# Patient Record
Sex: FEMALE | Race: BLACK OR AFRICAN AMERICAN | NOT HISPANIC OR LATINO | Employment: FULL TIME | URBAN - METROPOLITAN AREA
[De-identification: names, ages, dates, MRNs, and addresses within clinical notes are randomized per-mention and may not be internally consistent; named-entity substitution may affect disease eponyms.]

---

## 2017-04-19 ENCOUNTER — APPOINTMENT (OUTPATIENT)
Dept: LAB | Facility: HOSPITAL | Age: 47
End: 2017-04-19

## 2017-04-19 ENCOUNTER — OFFICE VISIT (OUTPATIENT)
Dept: INTERNAL MEDICINE | Age: 47
End: 2017-04-19

## 2017-04-19 ENCOUNTER — HOSPITAL ENCOUNTER (OUTPATIENT)
Dept: MRI IMAGING | Facility: HOSPITAL | Age: 47
Discharge: HOME | End: 2017-04-19
Admitting: INTERNAL MEDICINE

## 2017-04-19 VITALS
TEMPERATURE: 97.5 F | BODY MASS INDEX: 48.32 KG/M2 | HEIGHT: 64 IN | OXYGEN SATURATION: 96 % | HEART RATE: 98 BPM | SYSTOLIC BLOOD PRESSURE: 132 MMHG | DIASTOLIC BLOOD PRESSURE: 78 MMHG | WEIGHT: 283 LBS

## 2017-04-19 DIAGNOSIS — R00.2 HEART PALPITATIONS: ICD-10-CM

## 2017-04-19 DIAGNOSIS — J45.30 MILD PERSISTENT ASTHMA WITHOUT COMPLICATION: Chronic | ICD-10-CM

## 2017-04-19 DIAGNOSIS — R20.0 RIGHT FACIAL NUMBNESS: Primary | ICD-10-CM

## 2017-04-19 DIAGNOSIS — I10 ESSENTIAL HYPERTENSION: Chronic | ICD-10-CM

## 2017-04-19 DIAGNOSIS — E66.01 MORBID OBESITY, UNSPECIFIED OBESITY TYPE (HCC): Chronic | ICD-10-CM

## 2017-04-19 DIAGNOSIS — G43.109 MIGRAINE WITH AURA AND WITHOUT STATUS MIGRAINOSUS, NOT INTRACTABLE: Chronic | ICD-10-CM

## 2017-04-19 LAB
ALBUMIN SERPL-MCNC: 3.8 G/DL (ref 3.5–5.2)
ALBUMIN/GLOB SERPL: 1 G/DL
ALP SERPL-CCNC: 89 U/L (ref 39–117)
ALT SERPL W P-5'-P-CCNC: 16 U/L (ref 1–33)
ANION GAP SERPL CALCULATED.3IONS-SCNC: 12.4 MMOL/L
AST SERPL-CCNC: 16 U/L (ref 1–32)
BASOPHILS # BLD AUTO: 0.02 10*3/MM3 (ref 0–0.2)
BASOPHILS NFR BLD AUTO: 0.2 % (ref 0–1.5)
BILIRUB SERPL-MCNC: 0.2 MG/DL (ref 0.1–1.2)
BUN BLD-MCNC: 10 MG/DL (ref 6–20)
BUN/CREAT SERPL: 11.6 (ref 7–25)
CALCIUM SPEC-SCNC: 9.5 MG/DL (ref 8.6–10.5)
CHLORIDE SERPL-SCNC: 101 MMOL/L (ref 98–107)
CO2 SERPL-SCNC: 27.6 MMOL/L (ref 22–29)
CREAT BLD-MCNC: 0.86 MG/DL (ref 0.57–1)
DEPRECATED RDW RBC AUTO: 51.1 FL (ref 37–54)
EOSINOPHIL # BLD AUTO: 0.1 10*3/MM3 (ref 0–0.7)
EOSINOPHIL NFR BLD AUTO: 1.2 % (ref 0.3–6.2)
ERYTHROCYTE [DISTWIDTH] IN BLOOD BY AUTOMATED COUNT: 17.3 % (ref 11.7–13)
GFR SERPL CREATININE-BSD FRML MDRD: 71 ML/MIN/1.73
GLOBULIN UR ELPH-MCNC: 3.8 GM/DL
GLUCOSE BLD-MCNC: 80 MG/DL (ref 65–99)
HBA1C MFR BLD: 6.63 % (ref 4.8–5.6)
HCT VFR BLD AUTO: 37.4 % (ref 35.6–45.5)
HGB BLD-MCNC: 11.1 G/DL (ref 11.9–15.5)
IMM GRANULOCYTES # BLD: 0 10*3/MM3 (ref 0–0.03)
IMM GRANULOCYTES NFR BLD: 0 % (ref 0–0.5)
LYMPHOCYTES # BLD AUTO: 2.07 10*3/MM3 (ref 0.9–4.8)
LYMPHOCYTES NFR BLD AUTO: 25.7 % (ref 19.6–45.3)
MCH RBC QN AUTO: 24 PG (ref 26.9–32)
MCHC RBC AUTO-ENTMCNC: 29.7 G/DL (ref 32.4–36.3)
MCV RBC AUTO: 81 FL (ref 80.5–98.2)
MONOCYTES # BLD AUTO: 0.36 10*3/MM3 (ref 0.2–1.2)
MONOCYTES NFR BLD AUTO: 4.5 % (ref 5–12)
NEUTROPHILS # BLD AUTO: 5.51 10*3/MM3 (ref 1.9–8.1)
NEUTROPHILS NFR BLD AUTO: 68.4 % (ref 42.7–76)
PLATELET # BLD AUTO: 397 10*3/MM3 (ref 140–500)
PMV BLD AUTO: 9.8 FL (ref 6–12)
POTASSIUM BLD-SCNC: 4.3 MMOL/L (ref 3.5–5.2)
PROT SERPL-MCNC: 7.6 G/DL (ref 6–8.5)
RBC # BLD AUTO: 4.62 10*6/MM3 (ref 3.9–5.2)
SODIUM BLD-SCNC: 141 MMOL/L (ref 136–145)
T4 FREE SERPL-MCNC: 1.18 NG/DL (ref 0.93–1.7)
TSH SERPL DL<=0.05 MIU/L-ACNC: 2.04 MIU/ML (ref 0.27–4.2)
WBC NRBC COR # BLD: 8.06 10*3/MM3 (ref 4.5–10.7)

## 2017-04-19 PROCEDURE — 84439 ASSAY OF FREE THYROXINE: CPT | Performed by: INTERNAL MEDICINE

## 2017-04-19 PROCEDURE — 99205 OFFICE O/P NEW HI 60 MIN: CPT | Performed by: INTERNAL MEDICINE

## 2017-04-19 PROCEDURE — 36415 COLL VENOUS BLD VENIPUNCTURE: CPT | Performed by: INTERNAL MEDICINE

## 2017-04-19 PROCEDURE — 83036 HEMOGLOBIN GLYCOSYLATED A1C: CPT | Performed by: INTERNAL MEDICINE

## 2017-04-19 PROCEDURE — 85025 COMPLETE CBC W/AUTO DIFF WBC: CPT | Performed by: INTERNAL MEDICINE

## 2017-04-19 PROCEDURE — A9577 INJ MULTIHANCE: HCPCS | Performed by: INTERNAL MEDICINE

## 2017-04-19 PROCEDURE — 82565 ASSAY OF CREATININE: CPT

## 2017-04-19 PROCEDURE — 84443 ASSAY THYROID STIM HORMONE: CPT | Performed by: INTERNAL MEDICINE

## 2017-04-19 PROCEDURE — 93000 ELECTROCARDIOGRAM COMPLETE: CPT | Performed by: INTERNAL MEDICINE

## 2017-04-19 PROCEDURE — 80053 COMPREHEN METABOLIC PANEL: CPT | Performed by: INTERNAL MEDICINE

## 2017-04-19 PROCEDURE — 70553 MRI BRAIN STEM W/O & W/DYE: CPT

## 2017-04-19 PROCEDURE — 93041 RHYTHM ECG TRACING: CPT | Performed by: INTERNAL MEDICINE

## 2017-04-19 PROCEDURE — 0 GADOBENATE DIMEGLUMINE 529 MG/ML SOLUTION: Performed by: INTERNAL MEDICINE

## 2017-04-19 RX ORDER — VALSARTAN AND HYDROCHLOROTHIAZIDE 320; 25 MG/1; MG/1
1 TABLET, FILM COATED ORAL DAILY
COMMUNITY
Start: 2017-04-19 | End: 2020-01-06 | Stop reason: ALTCHOICE

## 2017-04-19 RX ORDER — VALSARTAN AND HYDROCHLOROTHIAZIDE 320; 25 MG/1; MG/1
1 TABLET, FILM COATED ORAL DAILY
COMMUNITY
End: 2017-04-19 | Stop reason: SDUPTHER

## 2017-04-19 RX ORDER — ASPIRIN 325 MG
325 TABLET, DELAYED RELEASE (ENTERIC COATED) ORAL DAILY
Refills: 0 | COMMUNITY
Start: 2017-04-19 | End: 2022-09-13

## 2017-04-19 RX ORDER — MONTELUKAST SODIUM 10 MG/1
10 TABLET ORAL NIGHTLY
COMMUNITY
End: 2017-04-19 | Stop reason: SDUPTHER

## 2017-04-19 RX ORDER — MONTELUKAST SODIUM 10 MG/1
10 TABLET ORAL NIGHTLY
COMMUNITY
Start: 2017-04-19 | End: 2023-03-09

## 2017-04-19 RX ADMIN — GADOBENATE DIMEGLUMINE 20 ML: 529 INJECTION, SOLUTION INTRAVENOUS at 15:31

## 2017-04-19 NOTE — NURSING NOTE
Dr. Hobbs, radiologist, called to say patient's MRI looked good. Dr. Hobbs spoke with Dr. Villaseñor and patient is okay to go home and Dr. Villaseñor will follow up with patient tomorrow. Patient's IV removed and shown how to get out to the lobby.

## 2017-04-19 NOTE — PROGRESS NOTES
"Tori Urena / 46 y.o. / female  04/19/2017    VITALS:    /78  Pulse 98  Temp 97.5 °F (36.4 °C)  Ht 64\" (162.6 cm)  Wt 283 lb (128 kg)  SpO2 96%  BMI 48.58 kg/m2  BP Readings from Last 3 Encounters:   04/19/17 132/78     Wt Readings from Last 3 Encounters:   04/19/17 283 lb (128 kg)      Body mass index is 48.58 kg/(m^2).    CC: Main reason(s) for today's visit: Establish Care (new pt to establish care) and right side facial numbness Monday      HPI:    Patient is a 46 y.o. female who is here to establish care. Had episode of right facial numbness last >24 hours on Monday. Came on abruptly. At onset felt somewhat disoriented/confused and had left hand shaking. Denies loss of vision, slurred speech but did experience some difficulty speaking. Denies having had weakness, loss of coordination. Denies h/o seizures but has h/o migraine w/ aura. Risk factors include hypertension, prediabetes, morbid obesity and likely severe JAS. She denies recurrence of symptoms since Monday.       ____________________________________________________________________    ASSESSMENT & PLAN:    1. Right facial numbness    2. Migraine with aura and without status migrainosus, not intractable    3. Heart palpitations    4. Essential hypertension    5. Mild persistent asthma without complication    6. Morbid obesity, unspecified obesity type      Orders Placed This Encounter   Procedures   • MRI Brain With & Without Contrast   • Hemoglobin A1c   • Comprehensive Metabolic Panel   • TSH+Free T4   • Adult Transthoracic Echo Complete        Summary/Discussion:     ·  Differential diagnosis includes stroke, TIA, migraine aura without headache and less likely focal seizure.  · Will check stat MRI of the brain with and without contrast to rule out stroke  · Schedule carotid Doppler and echocardiogram but this/next week  · Start enteric-coated aspirin 325 mg daily  · Blood pressure appears to be controlled therefore continue " valsartan/hydrochlorothiazide 320/25 daily  · History of palpitations, check EKG and rhythm strip, consider Holter monitoring.  Check echocardiogram.  · On follow-up schedule sleep study for probable severe sleep apnea  · Instructed her to go to the emergency department if she has recurrent symptoms.  She expressed understanding.      Return in about 1 month (around 5/19/2017) for Reassess today's problem(s).    No future appointments.    ____________________________________________________________________    REVIEW OF SYSTEMS    Review of Systems   Constitutional: Positive for fatigue.   HENT: Negative.    Eyes: Negative.    Respiratory: Negative.  Apnea: snoring.    Cardiovascular: Positive for palpitations. Negative for chest pain.   Gastrointestinal: Negative.    Endocrine: Negative.         Morbidly obese    Genitourinary: Negative.    Musculoskeletal: Negative.    Skin: Negative.    Allergic/Immunologic: Negative.    Neurological: Positive for dizziness. Negative for seizures, syncope, facial asymmetry, speech difficulty (not currently) and weakness. Tremors: not currently. Numbness: right facial. Headaches: h/o migraines.   Hematological: Negative.    Psychiatric/Behavioral: Negative.      Other: As noted per HPI      PHYSICAL EXAMINATION    Physical Exam   Constitutional: She is oriented to person, place, and time. She appears well-developed and well-nourished. No distress.   morbidly obese    HENT:   Head: Normocephalic and atraumatic.   Right Ear: Tympanic membrane normal.   Left Ear: Tympanic membrane normal.   Mellampati Airway Class 3    Eyes: Conjunctivae and EOM are normal. Pupils are equal, round, and reactive to light. No scleral icterus.   Neck: Neck supple. No tracheal deviation present. No thyroid mass and no thyromegaly present.   Cardiovascular: Normal rate, regular rhythm, normal heart sounds and intact distal pulses.    Pulses:       Carotid pulses are 2+ on the right side, and 2+ on the left  side.  No carotid bruits   Pulmonary/Chest: Effort normal and breath sounds normal.   Abdominal: Soft. Bowel sounds are normal. She exhibits no distension and no mass. There is no tenderness. No hernia.   Protuberant    Musculoskeletal: She exhibits no edema.   Lymphadenopathy:     She has no cervical adenopathy.        Right: No supraclavicular adenopathy present.        Left: No supraclavicular adenopathy present.   Neurological: She is alert and oriented to person, place, and time. She has normal reflexes. No cranial nerve deficit. She exhibits normal muscle tone. Coordination normal.   Skin: Skin is warm. No rash noted. No pallor.   Psychiatric: She has a normal mood and affect. Her behavior is normal. Judgment and thought content normal.       REVIEWED DATA:    Labs:   No results found for: NA, K, AST, ALT, BUN, CREATININE, EGFRIFNONA, EGFRIFAFRI    No results found for: GLU, HGBA1C, MICROALBUR    No results found for: LDL, HDL, TRIG, CHOLHDLRATIO    No results found for: TSH, FREET4     No results found for: WBC, HGB, PLT      Imaging:        Medical Tests:   EKG: Performed today and reviewed results with patient.  normal EKG, normal sinus rhythm, there are no previous tracings available for comparison.       EKG rhythm: normal sinus rhythm    Summary of old records / correspondence / consultant report:        Request outside records:        ______________________________________________________________________    ALLERGIES:    Review of patient's allergies indicates no known allergies.    MEDICATIONS:       Current Outpatient Prescriptions   Medication Sig Dispense Refill   • montelukast (SINGULAIR) 10 MG tablet Take 1 tablet by mouth Every Night.     • valsartan-hydrochlorothiazide (DIOVAN-HCT) 320-25 MG per tablet Take 1 tablet by mouth Daily.       No current facility-administered medications for this visit.        PFSH: The following portions of the patient's history were reviewed and updated as  appropriate: Allergies / Current Medications / Past Medical History / Surgical History / Social History / Family History    PROBLEM LIST:    Patient Active Problem List   Diagnosis   • Hypertension   • Migraine with aura and without status migrainosus, not intractable   • Mild persistent asthma without complication   • Morbid obesity       PAST MEDICAL HX:    Past Medical History:   Diagnosis Date   • Asthma    • Hypertension    • Obesity        PAST SURGICAL HX:    History reviewed. No pertinent surgical history.    SOCIAL HX:    Social History     Social History   • Marital status: Single     Spouse name: N/A   • Number of children: 0   • Years of education: N/A     Occupational History   • [employer] ([employer])      Social History Main Topics   • Smoking status: Never Smoker   • Smokeless tobacco: None   • Alcohol use No   • Drug use: None   • Sexual activity: No     Other Topics Concern   • None     Social History Narrative   • None       FAMILY HX:    Family History   Problem Relation Age of Onset   • Diabetes Mother    • Hypertension Mother    • Diabetes Maternal Grandmother    • Colon cancer Maternal Grandmother    • Sickle cell anemia Father          **Tiffanie Disclaimer:   Much of this encounter note is an electronic transcription/translation of spoken language to printed text. The electronic translation of spoken language may permit erroneous, or at times, nonsensical words or phrases to be inadvertently transcribed. Although I have reviewed the note for such errors, some may still exist.

## 2017-04-20 ENCOUNTER — TELEPHONE (OUTPATIENT)
Dept: INTERNAL MEDICINE | Age: 47
End: 2017-04-20

## 2017-04-20 DIAGNOSIS — E11.9 TYPE 2 DIABETES MELLITUS WITHOUT COMPLICATION, WITHOUT LONG-TERM CURRENT USE OF INSULIN (HCC): ICD-10-CM

## 2017-04-20 DIAGNOSIS — R06.83 SNORING: Primary | ICD-10-CM

## 2017-04-20 LAB — CREAT BLDA-MCNC: 0.9 MG/DL (ref 0.6–1.3)

## 2017-04-20 NOTE — TELEPHONE ENCOUNTER
Talked w/ Tori :    1. Labs are c/w DM 2: start metformin 850 mg daily w/ supper.  2. MRI did not reveal any significant abnormalities that would explain her sxs. In particular no evidence of a stroke.   3. Recommended to proceed w/ carotids and echo but have them done by next week.  4. Will proceed w/ sleep study referral.  5. Continue ASA daily.  Keep f/u for May 19.    She expressed understanding and agreed to follow above instructions.

## 2017-05-03 ENCOUNTER — APPOINTMENT (OUTPATIENT)
Dept: SLEEP MEDICINE | Facility: HOSPITAL | Age: 47
End: 2017-05-03

## 2017-05-05 ENCOUNTER — APPOINTMENT (OUTPATIENT)
Dept: CARDIOLOGY | Facility: HOSPITAL | Age: 47
End: 2017-05-05

## 2017-05-08 ENCOUNTER — HOSPITAL ENCOUNTER (OUTPATIENT)
Dept: CARDIOLOGY | Facility: HOSPITAL | Age: 47
Discharge: HOME | End: 2017-05-08
Admitting: INTERNAL MEDICINE

## 2017-05-08 ENCOUNTER — HOSPITAL ENCOUNTER (OUTPATIENT)
Dept: CARDIOLOGY | Facility: HOSPITAL | Age: 47
Discharge: HOME | End: 2017-05-08

## 2017-05-08 VITALS
SYSTOLIC BLOOD PRESSURE: 150 MMHG | HEART RATE: 94 BPM | HEIGHT: 64 IN | DIASTOLIC BLOOD PRESSURE: 109 MMHG | BODY MASS INDEX: 48.32 KG/M2 | WEIGHT: 283 LBS

## 2017-05-08 LAB
AORTIC ARCH: 2.6 CM
ASCENDING AORTA: 2.9 CM
BH CV ECHO MEAS - ACS: 1.9 CM
BH CV ECHO MEAS - AO MAX PG (FULL): 6 MMHG
BH CV ECHO MEAS - AO MEAN PG (FULL): 1 MMHG
BH CV ECHO MEAS - AO MEAN PG: 3 MMHG
BH CV ECHO MEAS - AO ROOT AREA (BSA CORRECTED): 1.2
BH CV ECHO MEAS - AO ROOT AREA: 6.2 CM^2
BH CV ECHO MEAS - AO ROOT DIAM: 2.8 CM
BH CV ECHO MEAS - AO V2 MAX: 120 CM/SEC
BH CV ECHO MEAS - AO V2 MEAN: 87.5 CM/SEC
BH CV ECHO MEAS - AO V2 VTI: 26.1 CM
BH CV ECHO MEAS - ASC AORTA: 2.9 CM
BH CV ECHO MEAS - AVA(I,A): 2.3 CM^2
BH CV ECHO MEAS - AVA(I,D): 2.3 CM^2
BH CV ECHO MEAS - BSA(HAYCOCK): 2.5 M^2
BH CV ECHO MEAS - BSA: 2.3 M^2
BH CV ECHO MEAS - BZI_BMI: 48.6 KILOGRAMS/M^2
BH CV ECHO MEAS - BZI_METRIC_HEIGHT: 162.6 CM
BH CV ECHO MEAS - BZI_METRIC_WEIGHT: 128.4 KG
BH CV ECHO MEAS - CONTRAST EF (2CH): 66.2 ML/M^2
BH CV ECHO MEAS - CONTRAST EF 4CH: 62.5 ML/M^2
BH CV ECHO MEAS - EDV(CUBED): 103.8 ML
BH CV ECHO MEAS - EDV(MOD-SP2): 65 ML
BH CV ECHO MEAS - EDV(MOD-SP4): 56 ML
BH CV ECHO MEAS - EDV(TEICH): 102.4 ML
BH CV ECHO MEAS - EF(CUBED): 71.3 %
BH CV ECHO MEAS - EF(MOD-SP2): 66.2 %
BH CV ECHO MEAS - EF(MOD-SP4): 62.5 %
BH CV ECHO MEAS - EF(TEICH): 63 %
BH CV ECHO MEAS - ESV(CUBED): 29.8 ML
BH CV ECHO MEAS - ESV(MOD-SP2): 22 ML
BH CV ECHO MEAS - ESV(MOD-SP4): 21 ML
BH CV ECHO MEAS - ESV(TEICH): 37.9 ML
BH CV ECHO MEAS - FS: 34 %
BH CV ECHO MEAS - IVS/LVPW: 0.9
BH CV ECHO MEAS - IVSD: 0.9 CM
BH CV ECHO MEAS - LAT PEAK E' VEL: 10 CM/SEC
BH CV ECHO MEAS - LV DIASTOLIC VOL/BSA (35-75): 24.7 ML/M^2
BH CV ECHO MEAS - LV MASS(C)D: 153.4 GRAMS
BH CV ECHO MEAS - LV MASS(C)DI: 67.7 GRAMS/M^2
BH CV ECHO MEAS - LV MEAN PG: 2 MMHG
BH CV ECHO MEAS - LV SYSTOLIC VOL/BSA (12-30): 9.3 ML/M^2
BH CV ECHO MEAS - LV V1 MAX: 88 CM/SEC
BH CV ECHO MEAS - LV V1 MEAN: 62.5 CM/SEC
BH CV ECHO MEAS - LV V1 VTI: 18.7 CM
BH CV ECHO MEAS - LVIDD: 4.7 CM
BH CV ECHO MEAS - LVIDS: 3.1 CM
BH CV ECHO MEAS - LVLD AP2: 7.6 CM
BH CV ECHO MEAS - LVLD AP4: 7.8 CM
BH CV ECHO MEAS - LVLS AP2: 7 CM
BH CV ECHO MEAS - LVLS AP4: 7 CM
BH CV ECHO MEAS - LVOT AREA (M): 3.1 CM^2
BH CV ECHO MEAS - LVOT AREA: 3.1 CM^2
BH CV ECHO MEAS - LVOT DIAM: 2 CM
BH CV ECHO MEAS - LVPWD: 1 CM
BH CV ECHO MEAS - MED PEAK E' VEL: 10 CM/SEC
BH CV ECHO MEAS - MV A DUR: 0.09 SEC
BH CV ECHO MEAS - MV A MAX VEL: 83.9 CM/SEC
BH CV ECHO MEAS - MV DEC SLOPE: 504 CM/SEC^2
BH CV ECHO MEAS - MV DEC TIME: 0.17 SEC
BH CV ECHO MEAS - MV E MAX VEL: 63.7 CM/SEC
BH CV ECHO MEAS - MV E/A: 0.76
BH CV ECHO MEAS - MV MAX PG: 3 MMHG
BH CV ECHO MEAS - MV MEAN PG: 1 MMHG
BH CV ECHO MEAS - MV P1/2T MAX VEL: 92.1 CM/SEC
BH CV ECHO MEAS - MV P1/2T: 53.5 MSEC
BH CV ECHO MEAS - MV V2 MEAN: 56.8 CM/SEC
BH CV ECHO MEAS - MV V2 VTI: 20.9 CM
BH CV ECHO MEAS - MVA P1/2T LCG: 2.4 CM^2
BH CV ECHO MEAS - MVA(P1/2T): 4.1 CM^2
BH CV ECHO MEAS - MVA(VTI): 2.8 CM^2
BH CV ECHO MEAS - PA ACC SLOPE: 32.1 CM/SEC^2
BH CV ECHO MEAS - PA ACC TIME: 0.09 SEC
BH CV ECHO MEAS - PA MAX PG: 4.8 MMHG
BH CV ECHO MEAS - PA PR(ACCEL): 40.8 MMHG
BH CV ECHO MEAS - PA V2 MAX: 110 CM/SEC
BH CV ECHO MEAS - PULM A REVS DUR: 0.09 SEC
BH CV ECHO MEAS - PULM A REVS VEL: 28.6 CM/SEC
BH CV ECHO MEAS - PULM DIAS VEL: 42 CM/SEC
BH CV ECHO MEAS - PULM S/D: 0.85
BH CV ECHO MEAS - PULM SYS VEL: 35.5 CM/SEC
BH CV ECHO MEAS - QP/QS: 1.2
BH CV ECHO MEAS - RV MEAN PG: 1 MMHG
BH CV ECHO MEAS - RV V1 MEAN: 48.8 CM/SEC
BH CV ECHO MEAS - RV V1 VTI: 12.6 CM
BH CV ECHO MEAS - RVOT AREA: 5.7 CM^2
BH CV ECHO MEAS - RVOT DIAM: 2.7 CM
BH CV ECHO MEAS - SI(AO): 70.9 ML/M^2
BH CV ECHO MEAS - SI(CUBED): 32.7 ML/M^2
BH CV ECHO MEAS - SI(LVOT): 25.9 ML/M^2
BH CV ECHO MEAS - SI(MOD-SP2): 19 ML/M^2
BH CV ECHO MEAS - SI(MOD-SP4): 15.4 ML/M^2
BH CV ECHO MEAS - SI(TEICH): 28.4 ML/M^2
BH CV ECHO MEAS - SUP REN AO DIAM: 1.4 CM
BH CV ECHO MEAS - SV(AO): 160.7 ML
BH CV ECHO MEAS - SV(CUBED): 74 ML
BH CV ECHO MEAS - SV(LVOT): 58.7 ML
BH CV ECHO MEAS - SV(MOD-SP2): 43 ML
BH CV ECHO MEAS - SV(MOD-SP4): 35 ML
BH CV ECHO MEAS - SV(RVOT): 72.1 ML
BH CV ECHO MEAS - SV(TEICH): 64.4 ML
BH CV ECHO MEAS - TAPSE (>1.6): 1.9 CM2
BH CV XLRA - RV BASE: 3.2 CM
BH CV XLRA - TDI S': 12 CM/SEC
BH CV XLRA MEAS LEFT CCA RATIO VEL: -66.3 CM/SEC
BH CV XLRA MEAS LEFT DIST CCA EDV: -21.1 CM/SEC
BH CV XLRA MEAS LEFT DIST CCA PSV: -66.3 CM/SEC
BH CV XLRA MEAS LEFT DIST ICA EDV: -30 CM/SEC
BH CV XLRA MEAS LEFT DIST ICA PSV: -50.9 CM/SEC
BH CV XLRA MEAS LEFT ICA RATIO VEL: -22.9 CM/SEC
BH CV XLRA MEAS LEFT ICA/CCA RATIO: 0.35
BH CV XLRA MEAS LEFT MID ICA EDV: -18.3 CM/SEC
BH CV XLRA MEAS LEFT MID ICA PSV: -40.4 CM/SEC
BH CV XLRA MEAS LEFT PROX CCA EDV: 24 CM/SEC
BH CV XLRA MEAS LEFT PROX CCA PSV: 89.7 CM/SEC
BH CV XLRA MEAS LEFT PROX ECA EDV: -7 CM/SEC
BH CV XLRA MEAS LEFT PROX ECA PSV: -35.8 CM/SEC
BH CV XLRA MEAS LEFT PROX ICA EDV: -9.4 CM/SEC
BH CV XLRA MEAS LEFT PROX ICA PSV: -22.9 CM/SEC
BH CV XLRA MEAS LEFT PROX SCLA PSV: 127 CM/SEC
BH CV XLRA MEAS LEFT VERTEBRAL A EDV: 18.2 CM/SEC
BH CV XLRA MEAS LEFT VERTEBRAL A PSV: 50.4 CM/SEC
BH CV XLRA MEAS RIGHT CCA RATIO VEL: 69.2 CM/SEC
BH CV XLRA MEAS RIGHT DIST CCA EDV: 24.6 CM/SEC
BH CV XLRA MEAS RIGHT DIST CCA PSV: 69.2 CM/SEC
BH CV XLRA MEAS RIGHT DIST ICA EDV: 21.1 CM/SEC
BH CV XLRA MEAS RIGHT DIST ICA PSV: 45.7 CM/SEC
BH CV XLRA MEAS RIGHT ICA RATIO VEL: -58.7 CM/SEC
BH CV XLRA MEAS RIGHT ICA/CCA RATIO: -0.85
BH CV XLRA MEAS RIGHT MID ICA EDV: -20.5 CM/SEC
BH CV XLRA MEAS RIGHT MID ICA PSV: -52.8 CM/SEC
BH CV XLRA MEAS RIGHT PROX CCA EDV: -20.5 CM/SEC
BH CV XLRA MEAS RIGHT PROX CCA PSV: -80.9 CM/SEC
BH CV XLRA MEAS RIGHT PROX ECA EDV: -11.7 CM/SEC
BH CV XLRA MEAS RIGHT PROX ECA PSV: -76.8 CM/SEC
BH CV XLRA MEAS RIGHT PROX ICA EDV: -21 CM/SEC
BH CV XLRA MEAS RIGHT PROX ICA PSV: -58.7 CM/SEC
BH CV XLRA MEAS RIGHT PROX SCLA PSV: 84.1 CM/SEC
BH CV XLRA MEAS RIGHT VERTEBRAL A EDV: -12.3 CM/SEC
BH CV XLRA MEAS RIGHT VERTEBRAL A PSV: -32.2 CM/SEC
E/E' RATIO: 7
LEFT ARM BP: NORMAL MMHG
LEFT ATRIUM VOLUME INDEX: 16 ML/M2
LV EF 2D ECHO EST: 63 %
RIGHT ARM BP: NORMAL MMHG

## 2017-05-08 PROCEDURE — 93306 TTE W/DOPPLER COMPLETE: CPT | Performed by: INTERNAL MEDICINE

## 2017-05-08 PROCEDURE — 93880 EXTRACRANIAL BILAT STUDY: CPT

## 2017-05-08 PROCEDURE — 25010000002 PERFLUTREN (DEFINITY) 8.476 MG IN SODIUM CHLORIDE 10 ML INJECTION: Performed by: INTERNAL MEDICINE

## 2017-05-08 PROCEDURE — C8929 TTE W OR WO FOL WCON,DOPPLER: HCPCS

## 2017-05-08 RX ADMIN — SODIUM CHLORIDE 2 ML: 9 INJECTION INTRAMUSCULAR; INTRAVENOUS; SUBCUTANEOUS at 13:36

## 2017-05-09 ENCOUNTER — TELEPHONE (OUTPATIENT)
Dept: INTERNAL MEDICINE | Age: 47
End: 2017-05-09

## 2017-05-10 ENCOUNTER — TELEPHONE (OUTPATIENT)
Dept: INTERNAL MEDICINE | Age: 47
End: 2017-05-10

## 2017-10-05 DIAGNOSIS — E11.9 TYPE 2 DIABETES MELLITUS WITHOUT COMPLICATION, WITHOUT LONG-TERM CURRENT USE OF INSULIN (HCC): ICD-10-CM

## 2017-12-31 DIAGNOSIS — E11.9 TYPE 2 DIABETES MELLITUS WITHOUT COMPLICATION, WITHOUT LONG-TERM CURRENT USE OF INSULIN (HCC): ICD-10-CM

## 2018-04-20 ENCOUNTER — TELEPHONE (OUTPATIENT)
Dept: INTERNAL MEDICINE | Age: 48
End: 2018-04-20

## 2018-04-20 NOTE — TELEPHONE ENCOUNTER
Pt called for an appt regarding her having heartburn and pain in chest for about 2 wks. Pt stated she had seen cardiologist about a month ago and had a stress test done that turned out okay.  Wasn't sure if Charleen would want to see her or place her with Jaycee?  Pt's # [phone]  Thanks SP

## 2018-04-20 NOTE — TELEPHONE ENCOUNTER
Pt called stating she has been having pain in her neck goes down her right arm, but not to her wrist. She stated she had seen a chiropractor three times and his adjustments helped some, but she has 3 fingers on her right hand that still have numbness. Pt stated every morning when she wakes up her neck is painful.  Chiropractor told the pt he thought there was more to it, possible nerve damage.  No appt available anytime soon, should pt be seen with Jaycee?  Pt's # [phone]  Thanks SP

## 2018-04-23 ENCOUNTER — OFFICE VISIT (OUTPATIENT)
Dept: INTERNAL MEDICINE | Age: 48
End: 2018-04-23

## 2018-04-23 ENCOUNTER — HOSPITAL ENCOUNTER (OUTPATIENT)
Dept: GENERAL RADIOLOGY | Facility: HOSPITAL | Age: 48
Discharge: HOME | End: 2018-04-23
Admitting: NURSE PRACTITIONER

## 2018-04-23 VITALS
HEIGHT: 64 IN | OXYGEN SATURATION: 98 % | WEIGHT: 276.8 LBS | HEART RATE: 94 BPM | SYSTOLIC BLOOD PRESSURE: 134 MMHG | DIASTOLIC BLOOD PRESSURE: 84 MMHG | BODY MASS INDEX: 47.25 KG/M2 | TEMPERATURE: 97.6 F

## 2018-04-23 DIAGNOSIS — M54.12 CERVICAL RADICULOPATHY, ACUTE: Primary | ICD-10-CM

## 2018-04-23 PROCEDURE — 72050 X-RAY EXAM NECK SPINE 4/5VWS: CPT

## 2018-04-23 PROCEDURE — 99214 OFFICE O/P EST MOD 30 MIN: CPT | Performed by: NURSE PRACTITIONER

## 2018-04-23 RX ORDER — PREDNISONE 10 MG/1
TABLET ORAL
Qty: 1 EACH | Refills: 0 | Status: SHIPPED | OUTPATIENT
Start: 2018-04-23 | End: 2020-11-23

## 2018-04-23 RX ORDER — METOPROLOL TARTRATE 50 MG/1
100 TABLET, FILM COATED ORAL ONCE
COMMUNITY
End: 2020-11-23 | Stop reason: DRUGHIGH

## 2018-04-23 RX ORDER — CYCLOBENZAPRINE HCL 5 MG
5 TABLET ORAL 3 TIMES DAILY PRN
Qty: 15 TABLET | Refills: 0 | Status: SHIPPED | OUTPATIENT
Start: 2018-04-23 | End: 2023-03-09

## 2018-04-23 NOTE — PATIENT INSTRUCTIONS
Cervical Radiculopathy    Cervical radiculopathy happens when a nerve in the neck (cervical nerve) is pinched or bruised. This condition can develop because of an injury or as part of the normal aging process. Pressure on the cervical nerves can cause pain or numbness that runs from the neck all the way down into the arm and fingers. Usually, this condition gets better with rest. Treatment may be needed if the condition does not improve.  What are the causes?  This condition may be caused by:  · Injury.  · Slipped (herniated) disk.  · Muscle tightness in the neck because of overuse.  · Arthritis.  · Breakdown or degeneration in the bones and joints of the spine (spondylosis) due to aging.  · Bone spurs that may develop near the cervical nerves.  What are the signs or symptoms?  Symptoms of this condition include:  · Pain that runs from the neck to the arm and hand. The pain can be severe or irritating. It may be worse when the neck is moved.  · Numbness or weakness in the affected arm and hand.  How is this diagnosed?  This condition may be diagnosed based on symptoms, medical history, and a physical exam. You may also have tests, including:  · X-rays.  · CT scan.  · MRI.  · Electromyogram (EMG).  · Nerve conduction tests.  How is this treated?  In many cases, treatment is not needed for this condition. With rest, the condition usually gets better over time. If treatment is needed, options may include:  · Wearing a soft neck collar for short periods of time.  · Physical therapy to strengthen your neck muscles.  · Medicines, such as NSAIDs, oral corticosteroids, or spinal injections.  · Surgery. This may be needed if other treatments do not help. Various types of surgery may be done depending on the cause of your problems.  Follow these instructions at home:  Managing pain   · Take over-the-counter and prescription medicines only as told by your health care provider.  · If directed, apply ice to the affected  area.  ¨ Put ice in a plastic bag.  ¨ Place a towel between your skin and the bag.  ¨ Leave the ice on for 20 minutes, 2-3 times per day.  · If ice does not help, you can try using heat. Take a warm shower or warm bath, or use a heat pack as told by your health care provider.  · Try a gentle neck and shoulder massage to help relieve symptoms.  Activity   · Rest as needed. Follow instructions from your health care provider about any restrictions on activities.  · Do stretching and strengthening exercises as told by your health care provider or physical therapist.  General instructions   · If you were given a soft collar, wear it as told by your health care provider.  · Use a flat pillow when you sleep.  · Keep all follow-up visits as told by your health care provider. This is important.  Contact a health care provider if:  · Your condition does not improve with treatment.  Get help right away if:  · Your pain gets much worse and cannot be controlled with medicines.  · You have weakness or numbness in your hand, arm, face, or leg.  · You have a high fever.  · You have a stiff, rigid neck.  · You lose control of your bowels or your bladder (have incontinence).  · You have trouble with walking, balance, or speaking.  This information is not intended to replace advice given to you by your health care provider. Make sure you discuss any questions you have with your health care provider.  Document Released: 09/12/2002 Document Revised: 05/25/2017 Document Reviewed: 02/11/2016  [employer] Interactive Patient Education © 2017 [employer]

## 2018-04-23 NOTE — PROGRESS NOTES
Subjective   Tori Urena is a 47 y.o. female.     Neck Pain    This is a new problem. Episode onset: 3 weeks ago. The problem has been gradually worsening. The pain is associated with nothing. The pain is present in the right side. Nothing aggravates the symptoms. Associated symptoms include numbness and tingling (down right arm). Pertinent negatives include no fever, headaches, visual change, weakness or weight loss. Associated symptoms comments: Radiation down right lateral arm to 3, 4, 5th fingers. Treatments tried: chiropractor visits, ibuprofen, ASA, biofreeze. The treatment provided no relief.    Denies any trauma or injury, no known history of cervical spine issues. Patient does have PMH of low back pain/issues.     The following portions of the patient's history were reviewed and updated as appropriate: allergies, current medications, past family history, past medical history, past social history, past surgical history and problem list.    Review of Systems   Constitutional: Negative for fever and unexpected weight loss.   Musculoskeletal: Positive for neck pain.   Neurological: Positive for tingling (down right arm) and numbness. Negative for weakness and headaches.       Objective   Physical Exam   Constitutional: She appears well-developed and well-nourished. She is active. She does not appear ill. No distress.   Neck: Normal range of motion. Neck supple.   Positive right side Spurling maneuver. Positive shoulder abduction test.    Cardiovascular: Normal rate, regular rhythm and normal heart sounds.    Pulmonary/Chest: Effort normal and breath sounds normal. She has no decreased breath sounds. She has no wheezes. She has no rhonchi. She has no rales.   Neurological: She is alert.   Nursing note and vitals reviewed.        Assessment/Plan   Problems Addressed this Visit     None      Visit Diagnoses     Cervical radiculopathy, acute    -  Primary    Relevant Medications    PredniSONE (DELTASONE) 10 MG  (21) tablet pack    cyclobenzaprine (FLEXERIL) 5 MG tablet    Other Relevant Orders    XR Spine Cervical Complete 4 or 5 View        1. Cervical radiculopathy, acute  Patient with acute onset left cervical radiculopathy x 3 weeks, no trauma or known injury. Discussed with patient conservative treatment with course of PO steroids, muscle relaxants PRN. Discussed importance of avoid driving, operating machinery, cooking, etc, while taking Flexeril as it can cause excessive drowsiness. Will obtain C-spine XR Today. Discussed with patient can take a few weeks for pain to improve. Will follow up in 2 weeks for re-evaluation, consider further imaging at that time. Instructed to notify myself or Dr. Villaseñor sooner if new or worsening symptoms.     - XR Spine Cervical Complete 4 or 5 View  - PredniSONE (DELTASONE) 10 MG (21) tablet pack; Use as directed on package  Dispense: 1 each; Refill: 0  - cyclobenzaprine (FLEXERIL) 5 MG tablet; Take 1 tablet by mouth 3 (Three) Times a Day As Needed for Muscle Spasms.  Dispense: 15 tablet; Refill: 0

## 2020-01-06 ENCOUNTER — OFFICE VISIT (OUTPATIENT)
Dept: INTERNAL MEDICINE | Age: 50
End: 2020-01-06

## 2020-01-06 VITALS
HEART RATE: 92 BPM | TEMPERATURE: 97.2 F | RESPIRATION RATE: 10 BRPM | BODY MASS INDEX: 50.02 KG/M2 | DIASTOLIC BLOOD PRESSURE: 84 MMHG | HEIGHT: 64 IN | WEIGHT: 293 LBS | OXYGEN SATURATION: 100 % | SYSTOLIC BLOOD PRESSURE: 122 MMHG

## 2020-01-06 DIAGNOSIS — E11.9 TYPE 2 DIABETES MELLITUS WITHOUT COMPLICATION, WITHOUT LONG-TERM CURRENT USE OF INSULIN (HCC): Chronic | ICD-10-CM

## 2020-01-06 DIAGNOSIS — J45.909 ACUTE BRONCHITIS WITH ASTHMA: Primary | ICD-10-CM

## 2020-01-06 DIAGNOSIS — J20.9 ACUTE BRONCHITIS WITH ASTHMA: Primary | ICD-10-CM

## 2020-01-06 DIAGNOSIS — J45.41 ASTHMA IN ADULT, MODERATE PERSISTENT, WITH ACUTE EXACERBATION: ICD-10-CM

## 2020-01-06 PROCEDURE — 99214 OFFICE O/P EST MOD 30 MIN: CPT | Performed by: INTERNAL MEDICINE

## 2020-01-06 RX ORDER — GUAIFENESIN AND DEXTROMETHORPHAN HYDROBROMIDE 1200; 60 MG/1; MG/1
1 TABLET, EXTENDED RELEASE ORAL 2 TIMES DAILY
Qty: 24 EACH | Refills: 1 | Status: SHIPPED | OUTPATIENT
Start: 2020-01-06 | End: 2020-01-16

## 2020-01-06 RX ORDER — BENZONATATE 200 MG/1
CAPSULE ORAL
COMMUNITY
Start: 2020-01-02 | End: 2021-08-16

## 2020-01-06 RX ORDER — HYDROCHLOROTHIAZIDE 25 MG/1
TABLET ORAL
COMMUNITY
Start: 2020-01-04 | End: 2023-03-09

## 2020-01-06 RX ORDER — METHYLPREDNISOLONE 4 MG/1
TABLET ORAL
Qty: 1 EACH | Refills: 0 | Status: SHIPPED | OUTPATIENT
Start: 2020-01-06 | End: 2020-11-23

## 2020-01-06 RX ORDER — GLIPIZIDE 2.5 MG/1
2.5 TABLET, EXTENDED RELEASE ORAL DAILY
COMMUNITY
Start: 2019-12-28 | End: 2021-08-16 | Stop reason: ALTCHOICE

## 2020-01-06 RX ORDER — ALBUTEROL SULFATE 90 UG/1
AEROSOL, METERED RESPIRATORY (INHALATION)
COMMUNITY
Start: 2020-01-02

## 2020-01-06 RX ORDER — CANDESARTAN 32 MG/1
32 TABLET ORAL DAILY
COMMUNITY
End: 2023-03-09

## 2020-01-06 RX ORDER — FLUTICASONE PROPIONATE AND SALMETEROL XINAFOATE 230; 21 UG/1; UG/1
AEROSOL, METERED RESPIRATORY (INHALATION)
COMMUNITY
Start: 2020-01-02 | End: 2023-03-09

## 2020-01-06 NOTE — PROGRESS NOTES
"[facility] INTERNAL MEDICINE  SUSAN MOSQUEDA M.D.      Tori Urena / 49 y.o. / female  01/06/2020      CHIEF COMPLAINT     Cough (1 week ); Shortness of Breath; and Wheezing         ASSESSMENT & PLAN     1. Acute bronchitis with asthma    2. Asthma in adult, moderate persistent, with acute exacerbation    3. Type 2 diabetes mellitus without complication, without long-term current use of insulin (CMS/[facility])      No orders of the defined types were placed in this encounter.    New Medications Ordered This Visit   Medications   • methylPREDNISolone (MEDROL) 4 MG tablet     Sig: Take as directed on package instructions.     Dispense:  1 each     Refill:  0   • Dextromethorphan-guaiFENesin (MUCINEX DM MAXIMUM STRENGTH)  MG tablet sustained-release 12 hour     Sig: Take 1 tablet by mouth 2 (Two) Times a Day for 10 days.     Dispense:  24 each     Refill:  1       Summary/Discussion:  · Moderately severe exacerbation of asthma.   · Medrol Boyd, uses Advair HFA 2 puffs BID (not PRN), use albuterol neb PRN   · Instructed Tori to go to the emergency department if acutely worse.  She expressed understanding.   · Monitor sugars carefully and increase dose of glipizide if needed.     Next Appointment with me: Visit date not found    Return for worsening or lack of improvement.      VITAL SIGNS     Visit Vitals  /84   Pulse 92   Temp 97.2 °F (36.2 °C)   Resp 10   Ht 162.6 cm (64\")   Wt 133 kg (293 lb)   SpO2 100%   BMI 50.29 kg/m²       BP Readings from Last 3 Encounters:   01/06/20 122/84   04/23/18 134/84   05/08/17 (!) 150/109     Wt Readings from Last 3 Encounters:   01/06/20 133 kg (293 lb)   04/23/18 126 kg (276 lb 12.8 oz)   05/08/17 128 kg (283 lb)      Body mass index is 50.29 kg/m².        HISTORY OF PRESENT ILLNESS      2 weeks of increased cough, wheezing, and shortness of breath. No fever or chills. Placed on Augmentin, over-the-counter cold medication, using Advair inhaler \"as needed\", has nebulizer for home " use. Denies chest pain. Difficult to breath when lying on her back. No swelling of legs. A1c 6 for DM 2 managed by outside provider.     Patient Care Team:  Tate Villaseñor MD as PCP - General (Internal Medicine)      REVIEW OF SYSTEMS       Review of Systems  Constitutional neg fever  Resp per HPI   CV neg chest pain, edema, palpitations  GI neg       PHYSICAL EXAMINATION     Physical Exam  Constitutional  No distress, morbidly obese   Cardiovascular Rate  normal . Rhythm: regular . Heart sounds:  Normal  Lungs bilateral exp wheezing without rales or crackles   Psychiatric  Alert. Judgment intact. Thought content normal. Mood normal    REVIEWED DATA       Labs:     Lab Results   Component Value Date     04/19/2017    K 4.3 04/19/2017    CALCIUM 9.5 04/19/2017    AST 16 04/19/2017    ALT 16 04/19/2017    BUN 10 04/19/2017    CREATININE 0.90 04/19/2017    CREATININE 0.86 04/19/2017    EGFRIFNONA 71 04/19/2017       Lab Results   Component Value Date    HGBA1C 6.63 (H) 04/19/2017       No results found for: LDL, HDL, TRIG, CHOLHDLRATIO    Lab Results   Component Value Date    TSH 2.040 04/19/2017    FREET4 1.18 04/19/2017       Lab Results   Component Value Date    WBC 8.06 04/19/2017    HGB 11.1 (L) 04/19/2017     04/19/2017       No results found for: PROTEIN, GLUCOSEU, BLOODU, NITRITEU, LEUKOCYTESUR    Imaging:         Medical Tests:         Summary of old records / correspondence / consultant report:         Request outside records:         ALLERGIES  No Known Allergies     MEDICATIONS  Current Outpatient Medications   Medication Sig Dispense Refill   • ADVAIR -21 MCG/ACT inhaler INL 2 PFS PO BID     • albuterol sulfate  (90 Base) MCG/ACT inhaler INL 2 PFS PO Q 6 H     • benzonatate (TESSALON) 200 MG capsule TK 1 C PO TID FOR 5 DAYS     • candesartan (ATACAND) 32 MG tablet Take 32 mg by mouth Daily.     • Cholecalciferol (VITAMIN D3) 25 MCG (1000 UT) capsule TK 1 C PO QD     • glipizide  (GLUCOTROL XL) 2.5 MG 24 hr tablet Take 2.5 mg by mouth Daily.     • hydroCHLOROthiazide (HYDRODIURIL) 25 MG tablet      • metoprolol tartrate (LOPRESSOR) 50 MG tablet Take 100 mg by mouth 1 (One) Time.     • montelukast (SINGULAIR) 10 MG tablet Take 1 tablet by mouth Every Night.     • PredniSONE (DELTASONE) 10 MG (21) tablet pack Use as directed on package 1 each 0   • aspirin  MG tablet Take 1 tablet by mouth Daily.  0   • cyclobenzaprine (FLEXERIL) 5 MG tablet Take 1 tablet by mouth 3 (Three) Times a Day As Needed for Muscle Spasms. 15 tablet 0     PFSH:     The following portions of the patient's history were reviewed and updated as appropriate: Allergies / Current Medications / Past Medical History / Surgical History / Social History / Family History    PROBLEM LIST   Patient Active Problem List   Diagnosis   • Hypertension   • Migraine with aura and without status migrainosus, not intractable   • Mild persistent asthma without complication   • Morbid obesity (CMS/HCC)   • Type 2 diabetes mellitus without complication, without long-term current use of insulin (CMS/HCC)       PAST MEDICAL HISTORY  Past Medical History:   Diagnosis Date   • Asthma    • Hypertension    • Obesity        SURGICAL HISTORY  History reviewed. No pertinent surgical history.    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 The electronic translation of spoken language may permit erroneous, or at times, nonsensical words or phrases to be inadvertently transcribed. Although I have reviewed the note for such errors, some may still exist.       Template created by James Villaseñor MD

## 2020-01-06 NOTE — PATIENT INSTRUCTIONS
** IMPORTANT MESSAGE FROM DR. MOSQUEDA **    In our office, your satisfaction is VERY important to us.     You may receive a survey from [employer] by mail or E-mail for you to provide feedback about your visit. This information is invaluable for me to know what we can do to improve our services.     I ask that you please take a few minutes to complete the survey and let us know how we are doing in serving your needs. (You may receive the survey more than once for multiple visits)    Thank You !    Dr. Mosqueda & Staff    __________________________________________________________      ADDITIONAL INSTRUCTION / REMINDERS FROM DR. MOSQUEDA

## 2020-11-23 ENCOUNTER — OFFICE VISIT (OUTPATIENT)
Dept: INTERNAL MEDICINE | Age: 50
End: 2020-11-23

## 2020-11-23 VITALS
DIASTOLIC BLOOD PRESSURE: 108 MMHG | HEIGHT: 64 IN | OXYGEN SATURATION: 96 % | SYSTOLIC BLOOD PRESSURE: 148 MMHG | TEMPERATURE: 97.1 F | BODY MASS INDEX: 48.14 KG/M2 | HEART RATE: 91 BPM | WEIGHT: 282 LBS

## 2020-11-23 DIAGNOSIS — R42 VERTIGO: Primary | ICD-10-CM

## 2020-11-23 DIAGNOSIS — I10 ESSENTIAL HYPERTENSION: Chronic | ICD-10-CM

## 2020-11-23 DIAGNOSIS — G43.109 MIGRAINE WITH AURA AND WITHOUT STATUS MIGRAINOSUS, NOT INTRACTABLE: Chronic | ICD-10-CM

## 2020-11-23 DIAGNOSIS — E66.01 MORBID OBESITY (HCC): Chronic | ICD-10-CM

## 2020-11-23 DIAGNOSIS — E11.9 TYPE 2 DIABETES MELLITUS WITHOUT COMPLICATION, WITHOUT LONG-TERM CURRENT USE OF INSULIN (HCC): Chronic | ICD-10-CM

## 2020-11-23 PROBLEM — J45.40 MODERATE PERSISTENT ASTHMA WITHOUT COMPLICATION: Status: ACTIVE | Noted: 2020-11-23

## 2020-11-23 PROCEDURE — 99214 OFFICE O/P EST MOD 30 MIN: CPT | Performed by: INTERNAL MEDICINE

## 2020-11-23 RX ORDER — METOPROLOL SUCCINATE 100 MG/1
TABLET, EXTENDED RELEASE ORAL DAILY
COMMUNITY
Start: 2020-11-01 | End: 2023-03-09 | Stop reason: SDUPTHER

## 2020-11-23 RX ORDER — AMLODIPINE BESYLATE 5 MG/1
5 TABLET ORAL DAILY
COMMUNITY
Start: 2020-11-01 | End: 2021-08-16 | Stop reason: DRUGHIGH

## 2020-11-23 RX ORDER — MECLIZINE HCL 12.5 MG/1
12.5 TABLET ORAL 2 TIMES DAILY
Qty: 14 TABLET | Refills: 0 | Status: SHIPPED | OUTPATIENT
Start: 2020-11-23

## 2020-11-23 RX ORDER — DEXTROMETHORPHAN HYDROBROMIDE AND PROMETHAZINE HYDROCHLORIDE 15; 6.25 MG/5ML; MG/5ML
SYRUP ORAL
COMMUNITY
Start: 2020-11-10 | End: 2022-08-03

## 2020-11-23 RX ORDER — OMEPRAZOLE 20 MG/1
CAPSULE, DELAYED RELEASE ORAL
COMMUNITY
Start: 2020-11-01 | End: 2023-03-09 | Stop reason: SDUPTHER

## 2020-11-23 NOTE — PROGRESS NOTES
[facility]  SUSAN MOSQUEDA M.D.      Torigi Urena / 50 y.o. / female  11/23/2020    CHIEF COMPLAINT     Dizziness      ASSESSMENT & PLAN     Problem List Items Addressed This Visit        High    Vertigo - Primary    Current Assessment & Plan     Based on history most likely BPPV.   Referral for vestibular evaluation/therapy.  Meclizine 12.5 mg BID PRN for short term.          Relevant Medications    meclizine (ANTIVERT) 12.5 MG tablet    Other Relevant Orders    Ambulatory Referral to Physical Therapy Evaluate and treat, Vestibular       Medium    Hypertension (Chronic)    Current Assessment & Plan     Reports consistently normal blood pressure readings at home < 130/90. Advised to take blood pressure medication daily and monitor blood pressure daily. Follow-up with her PCP in [place].          Relevant Medications    candesartan (ATACAND) 32 MG tablet    hydroCHLOROthiazide (HYDRODIURIL) 25 MG tablet    amLODIPine (NORVASC) 5 MG tablet    metoprolol succinate XL (TOPROL-XL) 100 MG 24 hr tablet    Migraine with aura and without status migrainosus, not intractable (Chronic)    Current Assessment & Plan     Discussed migraine aura symptoms. Maintain good blood pressure control and avoid triggers. Wt loss strongly advised.              Relevant Medications    aspirin  MG tablet    cyclobenzaprine (FLEXERIL) 5 MG tablet    amLODIPine (NORVASC) 5 MG tablet    metoprolol succinate XL (TOPROL-XL) 100 MG 24 hr tablet    Type 2 diabetes mellitus without complication, without long-term current use of insulin (CMS/HCC) (Chronic)    Current Assessment & Plan     Advised to monitor for hypoglycemia symptoms. Monitor sugars daily. Follow-up with PCP in [place] regularly.          Relevant Medications    metFORMIN (GLUCOPHAGE) 850 MG tablet    glipizide (GLUCOTROL XL) 2.5 MG 24 hr tablet       Low    Morbid obesity (CMS/HCC) (Chronic)    Current Assessment & Plan     Wt loss advised.              Orders  "Placed This Encounter   Procedures   • Ambulatory Referral to Physical Therapy Evaluate and treat, Vestibular     New Medications Ordered This Visit   Medications   • meclizine (ANTIVERT) 12.5 MG tablet     Sig: Take 1 tablet by mouth 2 (two) times a day.     Dispense:  14 tablet     Refill:  0       Summary/Discussion:  •     Next Appointment with me: Visit date not found    Return with PCP in [place]; see me as needed for vertigo if not improved with vestibular therapy.    _____________________________________________________________________________________    VITAL SIGNS     Visit Vitals  BP (!) 148/108   Pulse 91   Temp 97.1 °F (36.2 °C)   Ht 162.6 cm (64\")   Wt 128 kg (282 lb)   SpO2 96%   BMI 48.41 kg/m²       BP Readings from Last 3 Encounters:   11/23/20 (!) 148/108   01/06/20 122/84   04/23/18 134/84     Wt Readings from Last 3 Encounters:   11/23/20 128 kg (282 lb)   01/06/20 133 kg (293 lb)   04/23/18 126 kg (276 lb 12.8 oz)     Body mass index is 48.41 kg/m².      MEDICATIONS     Current Outpatient Medications   Medication Sig Dispense Refill   • ADVAIR -21 MCG/ACT inhaler INL 2 PFS PO BID     • albuterol sulfate  (90 Base) MCG/ACT inhaler INL 2 PFS PO Q 6 H     • amLODIPine (NORVASC) 5 MG tablet Take 5 mg by mouth Daily.     • aspirin  MG tablet Take 1 tablet by mouth Daily.  0   • Cholecalciferol (VITAMIN D3) 25 MCG (1000 UT) capsule TK 1 C PO QD     • cyclobenzaprine (FLEXERIL) 5 MG tablet Take 1 tablet by mouth 3 (Three) Times a Day As Needed for Muscle Spasms. 15 tablet 0   • glipizide (GLUCOTROL XL) 2.5 MG 24 hr tablet Take 2.5 mg by mouth Daily.     • hydroCHLOROthiazide (HYDRODIURIL) 25 MG tablet      • metoprolol succinate XL (TOPROL-XL) 100 MG 24 hr tablet Take  by mouth Daily.     • montelukast (SINGULAIR) 10 MG tablet Take 1 tablet by mouth Every Night.     • omeprazole (priLOSEC) 20 MG capsule TK ONE C PO D     • promethazine-dextromethorphan (PROMETHAZINE-DM) 6.25-15 " "MG/5ML syrup TK 5 ML PO Q 4 TO 6 H PRF COUGH     • benzonatate (TESSALON) 200 MG capsule TK 1 C PO TID FOR 5 DAYS     • candesartan (ATACAND) 32 MG tablet Take 32 mg by mouth Daily.     • metFORMIN (GLUCOPHAGE) 850 MG tablet Take 1 tablet by mouth Daily With Dinner. *PT MUST MAKE APPT FOR FUTURE REFILLS* 30 tablet 0     No current facility-administered medications for this visit.        _____________________________________________________________________________________    HISTORY OF PRESENT ILLNESS     Several months history of intermittent \"dizziness\" when she turns her head quickly. Last < 1 minute and not associated with any other focal neuro symptoms.   Denies worsening migraines, denies neck pain. Denies change in vision. Hypertension: reports home blood pressure readings that are stable < 130/90's. Did not take blood pressure medication this morning. Denies chest pain or palpitations. DM 2 is managed by her PCP in [place]. Last A1c was around 7 according to her.       Patient Care Team:  Tate Villaseñor MD as PCP - General (Internal Medicine)      REVIEW OF SYSTEMS     Review of Systems  No fever, wt change  No chest pain or palpitations   No shortness of breath  GI neg  Neuro stable migraines without change; dizziness; no diplopia or dysarthria; no loss of balance or coordination; occasionally feels some \"numbness\" around the lips that come and go.       PHYSICAL EXAMINATION     Physical Exam  Constitutional:       Appearance: She is morbidly obese.   HENT:      Right Ear: Tympanic membrane normal.      Left Ear: Tympanic membrane normal.   Eyes:      Extraocular Movements: Extraocular movements intact.      Pupils: Pupils are equal, round, and reactive to light.   Neck:      Musculoskeletal: Neck supple.      Vascular: No carotid bruit.   Cardiovascular:      Rate and Rhythm: Normal rate and regular rhythm.      Heart sounds: Normal heart sounds. No murmur.   Pulmonary:      Effort: Pulmonary effort is " normal.      Breath sounds: Normal breath sounds.   Neurological:      General: No focal deficit present.      Mental Status: She is alert and oriented to person, place, and time.      Gait: Gait normal.   Psychiatric:         Thought Content: Thought content normal.         Judgment: Judgment normal.           REVIEWED DATA     Labs:     Lab Results   Component Value Date     04/19/2017    K 4.3 04/19/2017    CALCIUM 9.5 04/19/2017    AST 16 04/19/2017    ALT 16 04/19/2017    BUN 10 04/19/2017    CREATININE 0.90 04/19/2017    CREATININE 0.86 04/19/2017    EGFRIFNONA 71 04/19/2017       Lab Results   Component Value Date    HGBA1C 6.63 (H) 04/19/2017       No results found for: LDL, HDL, TRIG, CHOLHDLRATIO    Lab Results   Component Value Date    TSH 2.040 04/19/2017    FREET4 1.18 04/19/2017       Lab Results   Component Value Date    WBC 8.06 04/19/2017    HGB 11.1 (L) 04/19/2017     04/19/2017       No results found for: PROTEIN, GLUCOSEU, BLOODU, NITRITEU, LEUKOCYTESUR    Imaging:           Medical Tests:           Summary of old records / correspondence / consultant report:           Request outside records:           ALLERGIES  No Known Allergies     PFSH:     The following portions of the patient's history were reviewed and updated as appropriate: Allergies / Current Medications / Past Medical History / Surgical History / Social History / Family History    PROBLEM LIST   Patient Active Problem List   Diagnosis   • Hypertension   • Migraine with aura and without status migrainosus, not intractable   • Morbid obesity (CMS/[facility])   • Type 2 diabetes mellitus without complication, without long-term current use of insulin (CMS/[facility])   • Vertigo   • Moderate persistent asthma without complication       PAST MEDICAL HISTORY  Past Medical History:   Diagnosis Date   • Asthma    • Hypertension    • Obesity        SURGICAL HISTORY  History reviewed. No pertinent surgical history.    SOCIAL HISTORY  Social  History     Socioeconomic History   • Marital status: Single     Spouse name: Not on file   • Number of children: 0   • Years of education: Not on file   • Highest education level: Not on file   Occupational History   • Occupation: State ([employer])   Tobacco Use   • Smoking status: Never Smoker   • Smokeless tobacco: Never Used   Substance and Sexual Activity   • Alcohol use: No   • Sexual activity: Never       FAMILY HISTORY  Family History   Problem Relation Age of Onset   • Diabetes Mother    • Hypertension Mother    • Diabetes Maternal Grandmother    • Colon cancer Maternal Grandmother    • Sickle cell anemia Father          Examiner was wearing KN95 mask, face shield and exam gloves during the entire duration of the visit. Patient was masked the entire time.   Minimum social distance of 6 ft maintained entire visit except if physical contact was necessary as documented.     **Dragon Disclaimer:   Much of this encounter note is an electronic transcription/translation of spoken language to printed text. The electronic translation of spoken language may permit erroneous, or at times, nonsensical words or phrases to be inadvertently transcribed. Although I have reviewed the note for such errors, some may still exist.     Template created by James Villaseñor MD

## 2020-11-23 NOTE — ASSESSMENT & PLAN NOTE
Discussed migraine aura symptoms. Maintain good blood pressure control and avoid triggers. Wt loss strongly advised.

## 2020-11-23 NOTE — ASSESSMENT & PLAN NOTE
Based on history most likely BPPV.   Referral for vestibular evaluation/therapy.  Meclizine 12.5 mg BID PRN for short term.

## 2020-11-23 NOTE — ASSESSMENT & PLAN NOTE
Advised to monitor for hypoglycemia symptoms. Monitor sugars daily. Follow-up with PCP in [place] regularly.

## 2020-11-23 NOTE — PATIENT INSTRUCTIONS
** IMPORTANT MESSAGE FROM DR. MOSQUEDA **    In our office, your satisfaction is VERY important to us.     You may receive a survey from [facility] by mail or E-mail for you to provide feedback about your visit. This information is invaluable for me to know what we can do to improve our services.     I ask that you please take a few minutes to complete the survey and let us know how we are doing in serving your needs. (You may receive the survey more than once for multiple visits)    Thank You !    Dr. Mosqueda & Staff    _________________________________________________________________________________________________________________________      ** ADDITIONAL INSTRUCTION / REMINDERS FROM DR. MOSQUEDA **

## 2020-11-23 NOTE — ASSESSMENT & PLAN NOTE
Reports consistently normal blood pressure readings at home < 130/90. Advised to take blood pressure medication daily and monitor blood pressure daily. Follow-up with her PCP in [place].

## 2021-08-16 ENCOUNTER — OFFICE VISIT (OUTPATIENT)
Dept: INTERNAL MEDICINE | Age: 51
End: 2021-08-16

## 2021-08-16 VITALS
WEIGHT: 285 LBS | TEMPERATURE: 97.5 F | DIASTOLIC BLOOD PRESSURE: 90 MMHG | HEART RATE: 77 BPM | OXYGEN SATURATION: 95 % | BODY MASS INDEX: 48.65 KG/M2 | HEIGHT: 64 IN | SYSTOLIC BLOOD PRESSURE: 126 MMHG

## 2021-08-16 DIAGNOSIS — G43.109 MIGRAINE WITH AURA AND WITHOUT STATUS MIGRAINOSUS, NOT INTRACTABLE: Chronic | ICD-10-CM

## 2021-08-16 DIAGNOSIS — I10 ESSENTIAL HYPERTENSION: Primary | Chronic | ICD-10-CM

## 2021-08-16 DIAGNOSIS — E11.9 TYPE 2 DIABETES MELLITUS WITHOUT COMPLICATION, WITHOUT LONG-TERM CURRENT USE OF INSULIN (HCC): Chronic | ICD-10-CM

## 2021-08-16 PROBLEM — R42 VERTIGO: Status: RESOLVED | Noted: 2020-11-23 | Resolved: 2021-08-16

## 2021-08-16 PROCEDURE — 99214 OFFICE O/P EST MOD 30 MIN: CPT | Performed by: INTERNAL MEDICINE

## 2021-08-16 RX ORDER — SUMATRIPTAN 50 MG/1
TABLET, FILM COATED ORAL
COMMUNITY
Start: 2021-07-29

## 2021-08-16 RX ORDER — FLUTICASONE PROPIONATE 50 MCG
SPRAY, SUSPENSION (ML) NASAL
COMMUNITY
Start: 2021-07-29 | End: 2023-03-09 | Stop reason: SDUPTHER

## 2021-08-16 RX ORDER — SEMAGLUTIDE 1.34 MG/ML
INJECTION, SOLUTION SUBCUTANEOUS
COMMUNITY
Start: 2021-08-03

## 2021-08-16 RX ORDER — AMLODIPINE BESYLATE 10 MG/1
TABLET ORAL
COMMUNITY
Start: 2021-08-12 | End: 2022-04-07 | Stop reason: SDUPTHER

## 2021-08-16 NOTE — ASSESSMENT & PLAN NOTE
Blood pressure recently has been persistently remaining high. Suspect related to uncontrolled JAS (stopped using CPAP) and recently use of Advil migraine medication. Blood pressure is currently better after blood pressure medication modification by her PCP.     Advised to get back to using CPAP and avoid NSAIDS.   Follow-up with PCP regarding blood pressure.

## 2021-08-16 NOTE — PROGRESS NOTES
I N T E R N A L  M E D I C I N E  J U N O H  K I M,  M D      ENCOUNTER DATE:  08/16/2021    Tori Urena / 50 y.o. / female      CHIEF COMPLAINT / REASON FOR OFFICE VISIT     Headache (next to eye. back of head . BP was high 166/101. )      ASSESSMENT & PLAN     Problem List Items Addressed This Visit        High    Hypertension - Primary (Chronic)    Current Assessment & Plan     Blood pressure recently has been persistently remaining high. Suspect related to uncontrolled JAS (stopped using CPAP) and recently use of Advil migraine medication. Blood pressure is currently better after blood pressure medication modification by her PCP.     Advised to get back to using CPAP and avoid NSAIDS.   Follow-up with PCP regarding blood pressure.          Relevant Medications    candesartan (ATACAND) 32 MG tablet    hydroCHLOROthiazide (HYDRODIURIL) 25 MG tablet    metoprolol succinate XL (TOPROL-XL) 100 MG 24 hr tablet    amLODIPine (NORVASC) 10 MG tablet    Type 2 diabetes mellitus without complication, without long-term current use of insulin (CMS/HCC) (Chronic)    Current Assessment & Plan     Started on Ozempic about 3 weeks ago (good!).   Discuss with PCP about SGLT-2 inhibitor which would be ideal for her.          Relevant Medications    Ozempic, 0.25 or 0.5 MG/DOSE, 2 MG/1.5ML solution pen-injector       Medium    Migraine with aura and without status migrainosus, not intractable (Chronic)    Current Assessment & Plan     Avoid oral NSAIDS due to hypertension. Use Tylenol PRN for pain if needed.            Relevant Medications    aspirin  MG tablet    cyclobenzaprine (FLEXERIL) 5 MG tablet    metoprolol succinate XL (TOPROL-XL) 100 MG 24 hr tablet    SUMAtriptan (IMITREX) 50 MG tablet    amLODIPine (NORVASC) 10 MG tablet        No orders of the defined types were placed in this encounter.    No orders of the defined types were placed in this encounter.      SUMMARY/DISCUSSION  •       Next Appointment  "with me: 11/16/2021    Return in about 3 months (around 11/16/2021) for Reassess today's problem(s).      VITAL SIGNS     Visit Vitals  /90 (BP Location: Left arm)   Pulse 77   Temp 97.5 °F (36.4 °C)   Ht 162.6 cm (64\")   Wt 129 kg (285 lb)   LMP 06/16/2021 (Approximate)   SpO2 95%   BMI 48.92 kg/m²       BP Readings from Last 3 Encounters:   08/16/21 126/90   11/23/20 (!) 148/108   01/06/20 122/84     Wt Readings from Last 3 Encounters:   08/16/21 129 kg (285 lb)   11/23/20 128 kg (282 lb)   01/06/20 133 kg (293 lb)     Body mass index is 48.92 kg/m².      MEDICATIONS AT THE TIME OF OFFICE VISIT     Current Outpatient Medications on File Prior to Visit   Medication Sig   • ADVAIR -21 MCG/ACT inhaler INL 2 PFS PO BID   • albuterol sulfate  (90 Base) MCG/ACT inhaler INL 2 PFS PO Q 6 H   • amLODIPine (NORVASC) 10 MG tablet    • aspirin  MG tablet Take 1 tablet by mouth Daily.   • Breo Ellipta 100-25 MCG/INH inhaler INHALE 1 PUFF BY MOUTH 1 TIME EACH DAY   • candesartan (ATACAND) 32 MG tablet Take 32 mg by mouth Daily.   • Cholecalciferol (VITAMIN D3) 25 MCG (1000 UT) capsule TK 1 C PO QD   • cyclobenzaprine (FLEXERIL) 5 MG tablet Take 1 tablet by mouth 3 (Three) Times a Day As Needed for Muscle Spasms.   • fluticasone (FLONASE) 50 MCG/ACT nasal spray SHAKE GENTLY. USE 2 SPRAYS IN EACH NOSTRIL EVERY DAY.   • hydroCHLOROthiazide (HYDRODIURIL) 25 MG tablet    • meclizine (ANTIVERT) 12.5 MG tablet Take 1 tablet by mouth 2 (two) times a day.   • metoprolol succinate XL (TOPROL-XL) 100 MG 24 hr tablet Take  by mouth Daily.   • montelukast (SINGULAIR) 10 MG tablet Take 1 tablet by mouth Every Night.   • omeprazole (priLOSEC) 20 MG capsule TK ONE C PO D   • Ozempic, 0.25 or 0.5 MG/DOSE, 2 MG/1.5ML solution pen-injector INJECT 0.25 MG UNDER THE SKIN ONCE WEEKLY FOR 30 DAYS. THEN INCREASE TO 0.5 MG UNDER THE SKIN ONE TIME PER WEEK.   • promethazine-dextromethorphan (PROMETHAZINE-DM) 6.25-15 MG/5ML " syrup TK 5 ML PO Q 4 TO 6 H PRF COUGH   • SUMAtriptan (IMITREX) 50 MG tablet    • [DISCONTINUED] amLODIPine (NORVASC) 5 MG tablet Take 5 mg by mouth Daily. Take 10 mg daily   • [DISCONTINUED] benzonatate (TESSALON) 200 MG capsule TK 1 C PO TID FOR 5 DAYS   • [DISCONTINUED] glipizide (GLUCOTROL XL) 2.5 MG 24 hr tablet Take 2.5 mg by mouth Daily.   • [DISCONTINUED] metFORMIN (GLUCOPHAGE) 850 MG tablet Take 1 tablet by mouth Daily With Dinner. *PT MUST MAKE APPT FOR FUTURE REFILLS*     No current facility-administered medications on file prior to visit.          HISTORY OF PRESENT ILLNESS     Blood pressure was high for 2 weeks associated with left maxillary facial pain. Reports to using Advil migraine about twice a week. Stopped using CPAP 6 months ago. Denies visual change.       Patient Care Team:  Tate Villaseñor MD as PCP - General (Internal Medicine)    REVIEW OF SYSTEMS     Review of Systems   No significant wt change recently  No vision change  + left facial pain  No chest pain   No shortness of breath; stopped CPAP     PHYSICAL EXAMINATION     Physical Exam  No acute distress, alert, normal thought and judgment   Cardiovascular: Normal rate, regular rhythm.   Pulm/Chest: Effort normal, breath sounds normal.   1+ bilateral lower extremities edema       REVIEWED DATA     Labs:     .8  Creatinine 0.83      Imaging:           Medical Tests:           Summary of old records / correspondence / consultant report:           Request outside records:             *Examiner was wearing KN95 mask and eye protection during the entire duration of the visit. Patient was masked the entire time.   Minimum social distance of 6 ft maintained entire visit except if physical contact was necessary as documented.     **Dragon Disclaimer:   Much of this encounter note is an electronic transcription/translation of spoken language to printed text. The electronic translation of spoken language may permit erroneous, or at times,  nonsensical words or phrases to be inadvertently transcribed. Although I have reviewed the note for such errors, some may still exist.       Template created by James Villaseñor MD

## 2021-08-16 NOTE — ASSESSMENT & PLAN NOTE
Started on Ozempic about 3 weeks ago (good!).   Discuss with PCP about SGLT-2 inhibitor which would be ideal for her.

## 2022-02-09 DIAGNOSIS — R42 VERTIGO: ICD-10-CM

## 2022-02-10 RX ORDER — MECLIZINE HCL 12.5 MG/1
TABLET ORAL
Qty: 14 TABLET | Refills: 0 | OUTPATIENT
Start: 2022-02-10

## 2022-02-10 NOTE — TELEPHONE ENCOUNTER
PLEASE REFUSE    Pt contacted, vertigo episode over the wknd but much better today. She did not seek medical tx. Pt denies N/V, HA, dizziness @ this time. Pt does not need med at this time. She will f/u PRN. MM

## 2022-04-07 ENCOUNTER — HOSPITAL ENCOUNTER (OUTPATIENT)
Dept: GENERAL RADIOLOGY | Facility: HOSPITAL | Age: 52
Discharge: HOME | End: 2022-04-07
Admitting: INTERNAL MEDICINE

## 2022-04-07 ENCOUNTER — OFFICE VISIT (OUTPATIENT)
Dept: INTERNAL MEDICINE | Age: 52
End: 2022-04-07

## 2022-04-07 VITALS
SYSTOLIC BLOOD PRESSURE: 122 MMHG | OXYGEN SATURATION: 98 % | TEMPERATURE: 97.6 F | DIASTOLIC BLOOD PRESSURE: 76 MMHG | BODY MASS INDEX: 48.25 KG/M2 | HEIGHT: 64 IN | HEART RATE: 70 BPM | WEIGHT: 282.6 LBS

## 2022-04-07 DIAGNOSIS — I10 PRIMARY HYPERTENSION: Chronic | ICD-10-CM

## 2022-04-07 DIAGNOSIS — E66.01 MORBID OBESITY WITH BMI OF 45.0-49.9, ADULT: Chronic | ICD-10-CM

## 2022-04-07 DIAGNOSIS — M25.562 LEFT MEDIAL KNEE PAIN: Primary | ICD-10-CM

## 2022-04-07 PROCEDURE — 99214 OFFICE O/P EST MOD 30 MIN: CPT | Performed by: INTERNAL MEDICINE

## 2022-04-07 PROCEDURE — 73562 X-RAY EXAM OF KNEE 3: CPT

## 2022-04-07 RX ORDER — NAPROXEN SODIUM 220 MG
TABLET ORAL
COMMUNITY
Start: 2022-04-07 | End: 2023-03-09

## 2022-04-07 RX ORDER — AMLODIPINE BESYLATE 10 MG/1
10 TABLET ORAL DAILY
COMMUNITY
Start: 2022-03-17 | End: 2023-03-09

## 2022-04-07 RX ORDER — DAPAGLIFLOZIN 10 MG/1
10 TABLET, FILM COATED ORAL DAILY
COMMUNITY
Start: 2021-12-07 | End: 2023-03-09

## 2022-04-07 NOTE — PROGRESS NOTES
MyChart:    Here are the result(s) of your test(s):     Xray of the left knee shows mild medial compartment narrowing and mild effusion.  Continue plan as we discussed.  Follow-up in 2 weeks if symptoms are not significantly improved.    Please do not hesitate to contact me if you have questions.

## 2022-04-07 NOTE — PROGRESS NOTES
"    I N T E R N A L  M E D I C I N E  J U N O H  K I M,  M D      ENCOUNTER DATE:  2022    Tori Urena / 51 y.o. / female      CHIEF COMPLAINT / REASON FOR OFFICE VISIT     Leg Pain (Left leg pain on inside of knee. Pain x 2 mths, pt using Voltaren gel for discomfort. /During work unable to elevate leg, knee swells. /)      ASSESSMENT & PLAN     1. Left medial knee pain    2. Primary hypertension    3. Morbid obesity with BMI of 45.0-49.9, adult (HCC)      Orders Placed This Encounter   Procedures   • XR Knee 3 View Left     New Medications Ordered This Visit   Medications   • naproxen sodium (Aleve) 220 MG tablet     Si tabs twice daily x 3 days, then 1 tab twice daily x 10 days.       SUMMARY/DISCUSSION  • Check x-ray of the left knee to assess for medial compartment arthritis.  Consider MRI of the knee if not better.  • Take Aleve 2 tablets twice a day for 3 days with food, then 1 tablet twice a day with food for 10 days  • Continue Voltaren 1% ointment twice a day  • Follow-up in 2 weeks if not significantly better.  Consider injection of the knee for possible pes anserine bursitis.  • Monitor blood pressure closely while taking Aleve  • Discussed importance of weight loss       Next Appointment with me: 2022    Return for worsening or lack of improvement.        VITAL SIGNS     Visit Vitals  /76   Pulse 70   Temp 97.6 °F (36.4 °C)   Ht 162.6 cm (64.02\")   Wt 128 kg (282 lb 9.6 oz)   SpO2 98%   BMI 48.48 kg/m²       BP Readings from Last 3 Encounters:   22 122/76   21 126/90   20 (!) 148/108     Wt Readings from Last 3 Encounters:   22 128 kg (282 lb 9.6 oz)   21 129 kg (285 lb)   20 128 kg (282 lb)     Body mass index is 48.48 kg/m².    Blood pressure readings recorded on patient flowsheet:  No flowsheet data found.     MEDICATIONS AT THE TIME OF OFFICE VISIT     Current Outpatient Medications on File Prior to Visit   Medication Sig   • ADVAIR HFA " 230-21 MCG/ACT inhaler INL 2 PFS PO BID   • albuterol sulfate  (90 Base) MCG/ACT inhaler INL 2 PFS PO Q 6 H   • amLODIPine (NORVASC) 10 MG tablet Take 10 mg by mouth Daily.   • aspirin  MG tablet Take 1 tablet by mouth Daily.   • Breo Ellipta 100-25 MCG/INH inhaler INHALE 1 PUFF BY MOUTH 1 TIME EACH DAY   • candesartan (ATACAND) 32 MG tablet Take 32 mg by mouth Daily.   • Cholecalciferol 25 MCG (1000 UT) capsule Take 1,000 Units by mouth Daily.   • cyclobenzaprine (FLEXERIL) 5 MG tablet Take 1 tablet by mouth 3 (Three) Times a Day As Needed for Muscle Spasms.   • Dapagliflozin Propanediol (Farxiga) 10 MG tablet Take 10 mg by mouth Daily.   • fluticasone (FLONASE) 50 MCG/ACT nasal spray SHAKE GENTLY. USE 2 SPRAYS IN EACH NOSTRIL EVERY DAY.   • hydroCHLOROthiazide (HYDRODIURIL) 25 MG tablet    • meclizine (ANTIVERT) 12.5 MG tablet Take 1 tablet by mouth 2 (two) times a day.   • metoprolol succinate XL (TOPROL-XL) 100 MG 24 hr tablet Take  by mouth Daily.   • montelukast (SINGULAIR) 10 MG tablet Take 1 tablet by mouth Every Night.   • omeprazole (priLOSEC) 20 MG capsule TK ONE C PO D   • Ozempic, 0.25 or 0.5 MG/DOSE, 2 MG/1.5ML solution pen-injector INJECT 0.25 MG UNDER THE SKIN ONCE WEEKLY FOR 30 DAYS. THEN INCREASE TO 0.5 MG UNDER THE SKIN ONE TIME PER WEEK.   • promethazine-dextromethorphan (PROMETHAZINE-DM) 6.25-15 MG/5ML syrup TK 5 ML PO Q 4 TO 6 H PRF COUGH   • SUMAtriptan (IMITREX) 50 MG tablet    • Voltaren 1 % gel gel APPLY 1 GRAM TO THE AFFECTED AREA TWICE DAILY AS DIRECTED   • [DISCONTINUED] amLODIPine (NORVASC) 10 MG tablet    • [DISCONTINUED] Cholecalciferol (VITAMIN D3) 25 MCG (1000 UT) capsule TK 1 C PO QD     No current facility-administered medications on file prior to visit.          HISTORY OF PRESENT ILLNESS     Patient complains of 2 months history of persistent left medial knee pain.  She denies history of injury or trauma.  Denies history of fever or redness of the knee.  Mild  "swelling noted. She complains of \"stiffness\" when she tries to stand up after prolonged periods of sitting.  Pain is also worse with weightbearing on the medial aspect of the knee.  She has been using diclofenac 1% ointment with mild relief.  She has history of hypertension, diabetes and morbid obesity.        REVIEW OF SYSTEMS     GI negative for bleeding        PHYSICAL EXAMINATION     Physical Exam  Musculoskeletal:      Right hip: Normal.      Left hip: Normal.      Right knee: No swelling, deformity, effusion, erythema or crepitus. Normal range of motion.      Left knee: No swelling, deformity, effusion or erythema. Normal range of motion. Tenderness present over the medial joint line.       Morbidly obese; some discomfort due to knee pain        REVIEWED DATA     Labs:     Lab Results   Component Value Date     08/03/2021    K 3.9 08/03/2021    CALCIUM 9.6 08/03/2021    AST 16 08/03/2021    ALT 20 08/03/2021    BUN 9 08/03/2021    CREATININE 0.83 08/03/2021    CREATININE 0.90 04/19/2017    CREATININE 0.86 04/19/2017    EGFRIFNONA >60 08/03/2021    EGFRIFAFRI >60 08/03/2021       Lab Results   Component Value Date    HGBA1C 7.0 (A) 09/17/2021    HGBA1C 6.63 (H) 04/19/2017       Lab Results   Component Value Date    .8 (H) 08/03/2021    HDL 52 08/03/2021    TRIG 86 08/03/2021       Lab Results   Component Value Date    TSH 2.040 04/19/2017    FREET4 1.18 04/19/2017       Lab Results   Component Value Date    WBC 8.06 04/19/2017    HGB 11.1 (L) 04/19/2017     04/19/2017       Lab Results   Component Value Date    MICROALBUR 4.55 (H) 08/03/2021           Imaging:           Medical Tests:           Summary of old records / correspondence / consultant report:           Request outside records:             *Examiner was wearing KN95 mask and eye protection during the entire duration of the visit. Patient was masked the entire time. Minimum social distance of 6 ft maintained entire visit except if " physical contact was necessary as documented.       Template created by James Villaseñor MD

## 2022-04-07 NOTE — ASSESSMENT & PLAN NOTE
Wt Readings from Last 3 Encounters:   04/07/22 128 kg (282 lb 9.6 oz)   08/16/21 129 kg (285 lb)   11/23/20 128 kg (282 lb)      Body mass index is 48.48 kg/m².     Advised on weight loss for knee pain. Maintain a low sugar/starch/carbohydrate diet.

## 2022-07-28 ENCOUNTER — OFFICE VISIT (OUTPATIENT)
Dept: INTERNAL MEDICINE | Age: 52
End: 2022-07-28

## 2022-07-28 VITALS
TEMPERATURE: 96.9 F | OXYGEN SATURATION: 97 % | SYSTOLIC BLOOD PRESSURE: 126 MMHG | DIASTOLIC BLOOD PRESSURE: 80 MMHG | HEART RATE: 92 BPM | WEIGHT: 274 LBS | BODY MASS INDEX: 46.78 KG/M2 | HEIGHT: 64 IN

## 2022-07-28 DIAGNOSIS — H83.02 INFECTION OF LEFT INNER EAR: ICD-10-CM

## 2022-07-28 PROCEDURE — 99213 OFFICE O/P EST LOW 20 MIN: CPT | Performed by: NURSE PRACTITIONER

## 2022-07-28 RX ORDER — METHYLPREDNISOLONE 4 MG/1
TABLET ORAL
Qty: 1 EACH | Refills: 0 | Status: SHIPPED | OUTPATIENT
Start: 2022-07-28 | End: 2022-08-03

## 2022-07-28 RX ORDER — CEPHALEXIN 500 MG/1
500 CAPSULE ORAL 2 TIMES DAILY
Qty: 14 CAPSULE | Refills: 0 | Status: SHIPPED | OUTPATIENT
Start: 2022-07-28 | End: 2022-08-04

## 2022-07-28 NOTE — PROGRESS NOTES
"    I N T E R N A L  M E D I C I N E  Ramona Bee, APRN    ENCOUNTER DATE:  07/28/2022    Tori Urena / 51 y.o. / female      CHIEF COMPLAINT / REASON FOR OFFICE VISIT     Earache (Left )      ASSESSMENT & PLAN     Diagnoses and all orders for this visit:    1. Infection of left inner ear    Other orders  -     cephalexin (Keflex) 500 MG capsule; Take 1 capsule by mouth 2 (Two) Times a Day for 7 days.  Dispense: 14 capsule; Refill: 0  -     methylPREDNISolone (Medrol) 4 MG dose pack; Take as directed on package instructions.  Dispense: 1 each; Refill: 0         SUMMARY/DISCUSSION  • Discussion of Inner ear infection and management. Instructed to call if no improvement with antibiotics  • Follow up Dr Villaseñor as scheduled and as needed    No follow-ups on file.      VITAL SIGNS     Visit Vitals  /80   Pulse 92   Temp 96.9 °F (36.1 °C)   Ht 162.6 cm (64.02\")   Wt 124 kg (274 lb)   SpO2 97%   BMI 47.01 kg/m²           BP Readings from Last 3 Encounters:   07/28/22 126/80   04/07/22 122/76   08/16/21 126/90     Wt Readings from Last 3 Encounters:   07/28/22 124 kg (274 lb)   04/07/22 128 kg (282 lb 9.6 oz)   08/16/21 129 kg (285 lb)     Body mass index is 47.01 kg/m².    Blood pressure readings recorded on patient flowsheet:  No flowsheet data found.          MEDICATIONS AT THE TIME OF OFFICE VISIT     Current Outpatient Medications on File Prior to Visit   Medication Sig Dispense Refill   • ADVAIR -21 MCG/ACT inhaler INL 2 PFS PO BID     • albuterol sulfate  (90 Base) MCG/ACT inhaler INL 2 PFS PO Q 6 H     • amLODIPine (NORVASC) 10 MG tablet Take 10 mg by mouth Daily.     • aspirin  MG tablet Take 1 tablet by mouth Daily.  0   • Breo Ellipta 100-25 MCG/INH inhaler INHALE 1 PUFF BY MOUTH 1 TIME EACH DAY     • candesartan (ATACAND) 32 MG tablet Take 32 mg by mouth Daily.     • Cholecalciferol 25 MCG (1000 UT) capsule Take 1,000 Units by mouth Daily.     • cyclobenzaprine (FLEXERIL) 5 MG tablet " Take 1 tablet by mouth 3 (Three) Times a Day As Needed for Muscle Spasms. 15 tablet 0   • Dapagliflozin Propanediol (Farxiga) 10 MG tablet Take 10 mg by mouth Daily.     • fluticasone (FLONASE) 50 MCG/ACT nasal spray SHAKE GENTLY. USE 2 SPRAYS IN EACH NOSTRIL EVERY DAY.     • hydroCHLOROthiazide (HYDRODIURIL) 25 MG tablet      • meclizine (ANTIVERT) 12.5 MG tablet Take 1 tablet by mouth 2 (two) times a day. 14 tablet 0   • metoprolol succinate XL (TOPROL-XL) 100 MG 24 hr tablet Take  by mouth Daily.     • montelukast (SINGULAIR) 10 MG tablet Take 1 tablet by mouth Every Night.     • naproxen sodium (Aleve) 220 MG tablet 2 tabs twice daily x 3 days, then 1 tab twice daily x 10 days.     • omeprazole (priLOSEC) 20 MG capsule TK ONE C PO D     • Ozempic, 0.25 or 0.5 MG/DOSE, 2 MG/1.5ML solution pen-injector INJECT 0.25 MG UNDER THE SKIN ONCE WEEKLY FOR 30 DAYS. THEN INCREASE TO 0.5 MG UNDER THE SKIN ONE TIME PER WEEK.     • promethazine-dextromethorphan (PROMETHAZINE-DM) 6.25-15 MG/5ML syrup TK 5 ML PO Q 4 TO 6 H PRF COUGH     • SUMAtriptan (IMITREX) 50 MG tablet      • Voltaren 1 % gel gel APPLY 1 GRAM TO THE AFFECTED AREA TWICE DAILY AS DIRECTED       No current facility-administered medications on file prior to visit.        HISTORY OF PRESENT ILLNESS     51 year old female being seen today for acute left inner ear infection- pain, redness and drainage noted on tympanic membrane. Patient states started 2 days ago and no improvement with home dose of prednisone. Discussion of treatment and medications to pharmacy. Encouraged to call if no improvement with completion of antibiotics.       Patient Care Team:  Tate Villaseñor MD as PCP - General (Internal Medicine)    REVIEW OF SYSTEMS     Review of Systems   Constitutional: Negative for fever.   HENT: Positive for ear pain and facial swelling.    Eyes: Negative.    Respiratory: Negative.    Cardiovascular: Negative.    Musculoskeletal: Positive for neck pain.   Skin:  Negative.    Neurological: Positive for headaches. Negative for dizziness.          PHYSICAL EXAMINATION     Physical Exam  Cardiovascular:      Rate and Rhythm: Regular rhythm. Tachycardia present.      Pulses: Normal pulses.      Heart sounds: Normal heart sounds.   Pulmonary:      Effort: Pulmonary effort is normal.      Breath sounds: Normal breath sounds.   Musculoskeletal:      Cervical back: Tenderness present.   Lymphadenopathy:      Cervical: Cervical adenopathy present.   Skin:     General: Skin is warm and dry.   Neurological:      Mental Status: She is alert.             REVIEWED DATA     Labs:     Lab Results   Component Value Date     08/03/2021    K 3.9 08/03/2021    CALCIUM 9.6 08/03/2021    AST 16 08/03/2021    ALT 20 08/03/2021    BUN 9 08/03/2021    CREATININE 0.83 08/03/2021    CREATININE 0.90 04/19/2017    CREATININE 0.86 04/19/2017    EGFRIFNONA >60 08/03/2021    EGFRIFAFRI >60 08/03/2021       Lab Results   Component Value Date    HGBA1C 7.0 (A) 09/17/2021    HGBA1C 6.63 (H) 04/19/2017       Lab Results   Component Value Date    .8 (H) 08/03/2021    HDL 52 08/03/2021    TRIG 86 08/03/2021       Lab Results   Component Value Date    TSH 2.040 04/19/2017    FREET4 1.18 04/19/2017       Lab Results   Component Value Date    WBC 8.06 04/19/2017    HGB 11.1 (L) 04/19/2017     04/19/2017       No results found for: MALBCRERATIO       Imaging:           Medical Tests:           Summary of old records / correspondence / consultant report:           Request outside records:           EMANUEL Hallman

## 2022-08-03 ENCOUNTER — OFFICE VISIT (OUTPATIENT)
Dept: INTERNAL MEDICINE | Age: 52
End: 2022-08-03

## 2022-08-03 VITALS
BODY MASS INDEX: 45.75 KG/M2 | TEMPERATURE: 98.2 F | HEART RATE: 96 BPM | WEIGHT: 268 LBS | OXYGEN SATURATION: 97 % | DIASTOLIC BLOOD PRESSURE: 90 MMHG | HEIGHT: 64 IN | SYSTOLIC BLOOD PRESSURE: 130 MMHG

## 2022-08-03 DIAGNOSIS — B34.9 ACUTE VIRAL SYNDROME: ICD-10-CM

## 2022-08-03 DIAGNOSIS — R59.0 POSTERIOR CERVICAL LYMPHADENOPATHY: ICD-10-CM

## 2022-08-03 DIAGNOSIS — H66.90 EAR INFECTION: ICD-10-CM

## 2022-08-03 DIAGNOSIS — R21 RASH AND NONSPECIFIC SKIN ERUPTION: Primary | ICD-10-CM

## 2022-08-03 PROCEDURE — 99214 OFFICE O/P EST MOD 30 MIN: CPT | Performed by: INTERNAL MEDICINE

## 2022-08-03 RX ORDER — AZITHROMYCIN 250 MG/1
TABLET, FILM COATED ORAL
Qty: 6 TABLET | Refills: 0 | Status: SHIPPED | OUTPATIENT
Start: 2022-08-03 | End: 2022-08-08

## 2022-08-03 NOTE — PROGRESS NOTES
"    I N T E R N A L  M E D I C I N E  J U N O H  K I M,  M D      ENCOUNTER DATE:  08/03/2022    Tori Urena / 51 y.o. / female      CHIEF COMPLAINT / REASON FOR OFFICE VISIT     Earache (Sores after 2 days . Stopped keflex . Headache , fever Monday 103. Taking ibuprofen .)      ASSESSMENT & PLAN     1. Rash and nonspecific skin eruption    2. Acute viral syndrome    3. Posterior cervical lymphadenopathy    4. Ear infection      No orders of the defined types were placed in this encounter.    New Medications Ordered This Visit   Medications   • azithromycin (ZITHROMAX) 250 MG tablet     Sig: Take 2 tablets the first day, then 1 tablet daily for 4 days. (Take with food)     Dispense:  6 tablet     Refill:  0       SUMMARY/DISCUSSION  • Recommended azithromycin to complete treatment for possible left ear infection  • Recommended quarantine at home for recent skin eruption, fever and left posterior cervical lymphadenopathy. Discussed viral eruption, including possibility for monkeypox (lacks epidemiologic risk and therefore unlikely). Advised to monitor for evolution of skin lesions. We went over images of skin eruption.   • Continue Advil PRN for headaches and fever   • She was instructed to call for any worsening symptoms. She will update us through [employer] by Friday how she is doing.       Next Appointment with me: 9/13/2022    No follow-ups on file.        VITAL SIGNS     Vitals:    08/03/22 0815   BP: 130/90   BP Location: Left arm   Pulse: 96   Temp: 98.2 °F (36.8 °C)   SpO2: 97%   Weight: 122 kg (268 lb)   Height: 162.6 cm (64.02\")       BP Readings from Last 3 Encounters:   08/03/22 130/90   07/28/22 126/80   04/07/22 122/76     Wt Readings from Last 3 Encounters:   08/03/22 122 kg (268 lb)   07/28/22 124 kg (274 lb)   04/07/22 128 kg (282 lb 9.6 oz)     Body mass index is 45.97 kg/m².    Blood pressure readings recorded on patient flowsheet:  No flowsheet data found.     MEDICATIONS AT THE TIME OF OFFICE " VISIT     Current Outpatient Medications on File Prior to Visit   Medication Sig   • ADVAIR -21 MCG/ACT inhaler INL 2 PFS PO BID   • albuterol sulfate  (90 Base) MCG/ACT inhaler INL 2 PFS PO Q 6 H   • amLODIPine (NORVASC) 10 MG tablet Take 10 mg by mouth Daily.   • Breo Ellipta 100-25 MCG/INH inhaler INHALE 1 PUFF BY MOUTH 1 TIME EACH DAY   • candesartan (ATACAND) 32 MG tablet Take 32 mg by mouth Daily.   • cephalexin (Keflex) 500 MG capsule Take 1 capsule by mouth 2 (Two) Times a Day for 7 days.   • Cholecalciferol 25 MCG (1000 UT) capsule Take 1,000 Units by mouth Daily.   • cyclobenzaprine (FLEXERIL) 5 MG tablet Take 1 tablet by mouth 3 (Three) Times a Day As Needed for Muscle Spasms.   • Dapagliflozin Propanediol (Farxiga) 10 MG tablet Take 10 mg by mouth Daily.   • fluticasone (FLONASE) 50 MCG/ACT nasal spray SHAKE GENTLY. USE 2 SPRAYS IN EACH NOSTRIL EVERY DAY.   • hydroCHLOROthiazide (HYDRODIURIL) 25 MG tablet    • meclizine (ANTIVERT) 12.5 MG tablet Take 1 tablet by mouth 2 (two) times a day.   • metoprolol succinate XL (TOPROL-XL) 100 MG 24 hr tablet Take  by mouth Daily.   • montelukast (SINGULAIR) 10 MG tablet Take 1 tablet by mouth Every Night.   • naproxen sodium (Aleve) 220 MG tablet 2 tabs twice daily x 3 days, then 1 tab twice daily x 10 days.   • omeprazole (priLOSEC) 20 MG capsule TK ONE C PO D   • Ozempic, 0.25 or 0.5 MG/DOSE, 2 MG/1.5ML solution pen-injector INJECT 0.25 MG UNDER THE SKIN ONCE WEEKLY FOR 30 DAYS. THEN INCREASE TO 0.5 MG UNDER THE SKIN ONE TIME PER WEEK.   • SUMAtriptan (IMITREX) 50 MG tablet    • [DISCONTINUED] methylPREDNISolone (Medrol) 4 MG dose pack Take as directed on package instructions.   • [DISCONTINUED] promethazine-dextromethorphan (PROMETHAZINE-DM) 6.25-15 MG/5ML syrup TK 5 ML PO Q 4 TO 6 H PRF COUGH   • aspirin  MG tablet Take 1 tablet by mouth Daily.   • Voltaren 1 % gel gel APPLY 1 GRAM TO THE AFFECTED AREA TWICE DAILY AS DIRECTED     No current  facility-administered medications on file prior to visit.          HISTORY OF PRESENT ILLNESS     She was evaluated by APRN on July 28, 2022 and diagnosed with left ear infection.  She presented with left ear pain and left posterior cervical lymphadenopathy.  1 to 2 days after starting antibiotic she developed rash/skin eruption on her facial region associated with fever up to 103.7.  She had some headaches but denies having had significant myalgia, sore throat, cough, sinus congestion or pain.  She tested negative for COVID at home.  Patient denies any recent sexual contact or sleeping outside of her home.  Denies any contact or bites by animals.  She denies any recent travel.  She has not been in contact with anyone known to have or suspected of having monkeypox.  She has been taken Advil once daily and her fever has not returned.        REVIEW OF SYSTEMS             PHYSICAL EXAMINATION     Physical Exam  No acute distress , does not appear acutely ill   Alert with normal thought and judgment.   Papular eruptions in crops on left maxillary region; isolated papular (non vesicular/pustular) lesions on forehead, bilateral neck and back. No lesions on arms/legs.   Small palpable/tender posterior cervical superficial lymphadenopathy   No axillary lymphadenopathy   Cardiovascular: Normal rate, regular rhythm.   Pulm/Chest: Effort normal, breath sounds normal.       REVIEWED DATA     Labs:     Lab Results   Component Value Date     08/03/2021    K 3.9 08/03/2021    CALCIUM 9.6 08/03/2021    AST 16 08/03/2021    ALT 20 08/03/2021    BUN 9 08/03/2021    CREATININE 0.83 08/03/2021    CREATININE 0.90 04/19/2017    CREATININE 0.86 04/19/2017    EGFRIFNONA >60 08/03/2021    EGFRIFAFRI >60 08/03/2021       Lab Results   Component Value Date    HGBA1C 7.0 (A) 09/17/2021    HGBA1C 6.63 (H) 04/19/2017       Lab Results   Component Value Date    .8 (H) 08/03/2021    HDL 52 08/03/2021    TRIG 86 08/03/2021       Lab  Results   Component Value Date    TSH 2.040 04/19/2017    FREET4 1.18 04/19/2017       Lab Results   Component Value Date    WBC 8.06 04/19/2017    HGB 11.1 (L) 04/19/2017     04/19/2017       No results found for: MALBCRERATIO       Imaging:           Medical Tests:           Summary of old records / correspondence / consultant report:           Request outside records:             *Examiner was wearing [id number] mask and eye protection during the entire duration of the visit. Patient was masked the entire time. Minimum social distance of 6 ft maintained entire visit except if physical contact was necessary as documented.       Template created by James Villaseñor MD

## 2022-08-05 ENCOUNTER — TELEPHONE (OUTPATIENT)
Dept: INTERNAL MEDICINE | Age: 52
End: 2022-08-05

## 2022-08-05 NOTE — TELEPHONE ENCOUNTER
Recommend that the pt be evaluated by an urgent care close to her place of residence due to her history of fever, elevated heart rate and lower blood pressure.  Concerned for possible worsening infection.      ----- Message from Nayeli Shah MA sent at 8/5/2022 12:00 PM EDT -----  Regarding: FW: Follow-up 8/3 appointment     ----- Message -----  From: Tori Urena  Sent: 8/5/2022  11:58 AM EDT  To: [id number] Pool  Subject: Follow-up 8/3 appointment                        Facial red spots have cleared, fluid never filled any on my face, the one below my ear has dried, pretty certain it was a reaction to the medication. Still feel weak and blood pressure has remained low for the past few days w/o bp medication (108/74 pulse 114, 113/67 pulse 103, 106/76 pulse 105).

## 2022-08-05 NOTE — TELEPHONE ENCOUNTER
Pt said it is only update feel better . She will keep monitoring. Will go to [facility] or ER if symptoms go worse

## 2022-08-05 NOTE — TELEPHONE ENCOUNTER
Can you please offer the pt an appointment to be evaluated in the office?  She has seen Ramona in the past.    ----- Message from Nayeli Shah MA sent at 8/5/2022 12:00 PM EDT -----  Regarding: FW: Follow-up 8/3 appointment     ----- Message -----  From: Tori Urena  Sent: 8/5/2022  11:58 AM EDT  To: [id number] Clinical Pool  Subject: Follow-up 8/3 appointment                        Facial red spots have cleared, fluid never filled any on my face, the one below my ear has dried, pretty certain it was a reaction to the medication. Still feel weak and blood pressure has remained low for the past few days w/o bp medication (108/74 pulse 114, 113/67 pulse 103, 106/76 pulse 105).

## 2022-08-08 NOTE — TELEPHONE ENCOUNTER
Pt was holding her Bp medication and drinking plenty water. Pt thinks she has singles on the left side head, behind ears.

## 2022-08-16 ENCOUNTER — TELEMEDICINE (OUTPATIENT)
Dept: INTERNAL MEDICINE | Age: 52
End: 2022-08-16

## 2022-08-16 DIAGNOSIS — B02.29 PHN (POSTHERPETIC NEURALGIA): Primary | ICD-10-CM

## 2022-08-16 PROCEDURE — 99214 OFFICE O/P EST MOD 30 MIN: CPT | Performed by: INTERNAL MEDICINE

## 2022-08-16 RX ORDER — DULOXETIN HYDROCHLORIDE 30 MG/1
30 CAPSULE, DELAYED RELEASE ORAL DAILY
Qty: 30 CAPSULE | Refills: 0 | Status: SHIPPED | OUTPATIENT
Start: 2022-08-16 | End: 2022-09-13

## 2022-08-16 NOTE — PROGRESS NOTES
I N T E R N A L  M E D I C I N E  J U N O H  K I M,  M D      ENCOUNTER DATE:  08/16/2022    Tori Urena / 51 y.o. / female        You have chosen to receive care through a telehealth visit.  Do you consent to use a video/audio connection for your medical care today? Yes    Location of patient is in [place]       CHIEF COMPLAINT / REASON FOR OFFICE VISIT     TELEHEALTH ENCOUNTER:  Possible shingles      ASSESSMENT & PLAN     Problem List Items Addressed This Visit        High    PHN (postherpetic neuralgia) - Primary    Current Assessment & Plan     She probably did have shingles affecting left side of her face/scalp.   Will treat with duloxetine 30 mg daily for now but may titrate to 60 mg in 1 week if needed/tolerated.  She will update me on her course in 1 month.  She may consider getting the Shingrix vaccine in about 6 months.         Relevant Medications    DULoxetine (CYMBALTA) 30 MG capsule           No orders of the defined types were placed in this encounter.    New Medications Ordered This Visit   Medications   • DULoxetine (CYMBALTA) 30 MG capsule     Sig: Take 1 capsule by mouth Daily.     Dispense:  30 capsule     Refill:  0       SUMMARY/DISCUSSION  •       Time spent: 18 minutes    Next Appointment with me: 9/13/2022    No follow-ups on file.        HISTORY OF PRESENT ILLNESS     She was recently seen in the office for a viral illness syndrome.  She did have some vesicular type rash in crops on the left cheek region.  The rash itself has improved overall but she complains of dysesthesia and hypersensitivity symptoms involving the left scalp region.  She also has similar sensations affecting her left posterior auricular region as well as the neck and left shoulder area.  She thinks she may have had shingles and is interested in treatment options.      Exam: left facial rash is improving. No facial asymmetry. Alert with normal thought and judgment. Normal affect and mood.     REVIEWED DATA      Labs:           Imaging:           Medical Tests:           Summary of old records / correspondence / consultant report:           Request outside records:         Template created by James Villaseñor MD

## 2022-08-16 NOTE — ASSESSMENT & PLAN NOTE
She probably did have shingles affecting left side of her face/scalp.   Will treat with duloxetine 30 mg daily for now but may titrate to 60 mg in 1 week if needed/tolerated.  She will update me on her course in 1 month.  She may consider getting the Shingrix vaccine in about 6 months.

## 2022-08-30 DIAGNOSIS — Z00.00 PREVENTATIVE HEALTH CARE: Primary | ICD-10-CM

## 2022-09-13 ENCOUNTER — TELEMEDICINE (OUTPATIENT)
Dept: INTERNAL MEDICINE | Age: 52
End: 2022-09-13

## 2022-09-13 VITALS
SYSTOLIC BLOOD PRESSURE: 144 MMHG | BODY MASS INDEX: 45.97 KG/M2 | HEART RATE: 98 BPM | HEIGHT: 64 IN | DIASTOLIC BLOOD PRESSURE: 99 MMHG | TEMPERATURE: 97.6 F

## 2022-09-13 DIAGNOSIS — B02.29 PHN (POSTHERPETIC NEURALGIA): Primary | ICD-10-CM

## 2022-09-13 DIAGNOSIS — E11.9 TYPE 2 DIABETES MELLITUS WITHOUT COMPLICATION, WITHOUT LONG-TERM CURRENT USE OF INSULIN: Chronic | ICD-10-CM

## 2022-09-13 DIAGNOSIS — I10 PRIMARY HYPERTENSION: Chronic | ICD-10-CM

## 2022-09-13 PROCEDURE — 99214 OFFICE O/P EST MOD 30 MIN: CPT | Performed by: INTERNAL MEDICINE

## 2022-09-13 NOTE — ASSESSMENT & PLAN NOTE
She had stopped duloxetine on her own because her symptoms were not bothering her that much.  She was experiencing pruritus symptoms more so than pain.  We discussed possible course of PHN and that we will have to wait and watch to see how long her symptoms may last.

## 2022-09-13 NOTE — ASSESSMENT & PLAN NOTE
Blood pressure is elevated today especially diastolic blood pressure.  I advised her to take her medication regularly at the same time.  She may stagger her blood pressure medications for them in the morning and the other 2 in the evening.  She should take hydrochlorothiazide in the morning.  He complains of intermittent episodes of low blood pressure when she has been holding her hydrochlorothiazide.  I advised her to discuss with her primary care physician about perhaps decreasing hydrochlorothiazide to 12.5 mg daily.  She was advised to drink plenty of water and keep well-hydrated all times.  We also discussed the effect of Aleve and Advil on possibly elevating her blood pressure.  I advised her to minimize use of NSAIDs and take Tylenol instead as needed for pain.    BP Readings from Last 3 Encounters:   09/13/22 144/99   08/03/22 130/90   07/28/22 126/80

## 2022-09-13 NOTE — PROGRESS NOTES
I N T E R N A L  M E D I C I N E  J U N O H  K I M,  M D      ENCOUNTER DATE:  09/13/2022    Tori Urena / 51 y.o. / female        You have chosen to receive care through a telehealth visit.  Do you consent to use a video/audio connection for your medical care today? Yes    Location of patient is in [place], I know I am in [place]       CHIEF COMPLAINT / REASON FOR OFFICE VISIT     TELEHEALTH ENCOUNTER:  Herpes Zoster (1 month f/u. Itching some , headache , use ice pack help)      ASSESSMENT & PLAN     Problem List Items Addressed This Visit        High    PHN (postherpetic neuralgia) - Primary    Current Assessment & Plan     She had stopped duloxetine on her own because her symptoms were not bothering her that much.  She was experiencing pruritus symptoms more so than pain.  We discussed possible course of PHN and that we will have to wait and watch to see how long her symptoms may last.         Relevant Medications    DULoxetine (CYMBALTA) 30 MG capsule       Medium    Hypertension (Chronic)    Current Assessment & Plan     Blood pressure is elevated today especially diastolic blood pressure.  I advised her to take her medication regularly at the same time.  She may stagger her blood pressure medications for them in the morning and the other 2 in the evening.  She should take hydrochlorothiazide in the morning.  He complains of intermittent episodes of low blood pressure when she has been holding her hydrochlorothiazide.  I advised her to discuss with her primary care physician about perhaps decreasing hydrochlorothiazide to 12.5 mg daily.  She was advised to drink plenty of water and keep well-hydrated all times.  We also discussed the effect of Aleve and Advil on possibly elevating her blood pressure.  I advised her to minimize use of NSAIDs and take Tylenol instead as needed for pain.    BP Readings from Last 3 Encounters:   09/13/22 144/99   08/03/22 130/90   07/28/22 126/80             Relevant  Medications    candesartan (ATACAND) 32 MG tablet    hydroCHLOROthiazide (HYDRODIURIL) 25 MG tablet    metoprolol succinate XL (TOPROL-XL) 100 MG 24 hr tablet    amLODIPine (NORVASC) 10 MG tablet    Type 2 diabetes mellitus without complication, without long-term current use of insulin (HCC) (Chronic)    Current Assessment & Plan     She is currently on Ozempic 0.5 mg weekly, Farxiga 10 mg daily.  She was advised to follow-up closely with her primary care physician.    Lab Results   Component Value Date    HGBA1C 7.0 (A) 09/17/2021    HGBA1C 6.63 (H) 04/19/2017             Relevant Medications    Ozempic, 0.25 or 0.5 MG/DOSE, 2 MG/1.5ML solution pen-injector    Dapagliflozin Propanediol (Farxiga) 10 MG tablet           No orders of the defined types were placed in this encounter.    No orders of the defined types were placed in this encounter.      SUMMARY/DISCUSSION  •       Time spent: 25 minutes    Next Appointment with me: 3/9/2023    No follow-ups on file.        HISTORY OF PRESENT ILLNESS     She was previously started on duloxetine for possible PHN involving left side of her face and scalp.  She stopped taking the medication on her own because she was not experiencing itching symptoms more so than pain.  Overall it seems to be gradually getting better.  She inquires about how long she may have the symptoms.  Hypertension is elevated today at home.  She is on multidrug regimen for hypertension.  She has not taken her usual blood pressure medication for the morning.  She has also been taking over-the-counter NSAIDs for knee pain.  She denies worsening headaches, chest pain or vision changes.  She is currently on Ozempic 0.5 mg weekly along with Farxiga 10 mg daily.    REVIEWED DATA     Labs:     Lab Results   Component Value Date    HGBA1C 7.0 (A) 09/17/2021    HGBA1C 6.63 (H) 04/19/2017    CREATININE 0.83 08/03/2021    .8 (H) 08/03/2021          Imaging:           Medical Tests:           Summary of old  records / correspondence / consultant report:           Request outside records:         Template created by James Villaseñor MD

## 2022-09-13 NOTE — ASSESSMENT & PLAN NOTE
She is currently on Ozempic 0.5 mg weekly, Farxiga 10 mg daily.  She was advised to follow-up closely with her primary care physician.    Lab Results   Component Value Date    HGBA1C 7.0 (A) 09/17/2021    HGBA1C 6.63 (H) 04/19/2017

## 2023-03-09 ENCOUNTER — OFFICE VISIT (OUTPATIENT)
Dept: INTERNAL MEDICINE | Age: 53
End: 2023-03-09
Payer: COMMERCIAL

## 2023-03-09 VITALS
WEIGHT: 274 LBS | DIASTOLIC BLOOD PRESSURE: 80 MMHG | SYSTOLIC BLOOD PRESSURE: 124 MMHG | OXYGEN SATURATION: 95 % | HEIGHT: 64 IN | TEMPERATURE: 97.3 F | HEART RATE: 68 BPM | BODY MASS INDEX: 46.78 KG/M2

## 2023-03-09 DIAGNOSIS — J30.9 ALLERGIC RHINITIS, UNSPECIFIED SEASONALITY, UNSPECIFIED TRIGGER: ICD-10-CM

## 2023-03-09 DIAGNOSIS — E66.01 MORBID OBESITY WITH BMI OF 45.0-49.9, ADULT: Chronic | ICD-10-CM

## 2023-03-09 DIAGNOSIS — E55.9 VITAMIN D DEFICIENCY: ICD-10-CM

## 2023-03-09 DIAGNOSIS — J45.40 MODERATE PERSISTENT ASTHMA WITHOUT COMPLICATION: ICD-10-CM

## 2023-03-09 DIAGNOSIS — Z12.11 SCREENING FOR COLON CANCER: ICD-10-CM

## 2023-03-09 DIAGNOSIS — Z11.59 NEED FOR HEPATITIS C SCREENING TEST: ICD-10-CM

## 2023-03-09 DIAGNOSIS — E11.9 TYPE 2 DIABETES MELLITUS WITHOUT COMPLICATION, WITHOUT LONG-TERM CURRENT USE OF INSULIN: Chronic | ICD-10-CM

## 2023-03-09 DIAGNOSIS — G47.33 OSA (OBSTRUCTIVE SLEEP APNEA): Chronic | ICD-10-CM

## 2023-03-09 DIAGNOSIS — Z83.71 FAMILY HX COLONIC POLYPS: ICD-10-CM

## 2023-03-09 DIAGNOSIS — I10 PRIMARY HYPERTENSION: Chronic | ICD-10-CM

## 2023-03-09 DIAGNOSIS — K21.9 GASTROESOPHAGEAL REFLUX DISEASE, UNSPECIFIED WHETHER ESOPHAGITIS PRESENT: ICD-10-CM

## 2023-03-09 DIAGNOSIS — E66.01 MORBID (SEVERE) OBESITY DUE TO EXCESS CALORIES: ICD-10-CM

## 2023-03-09 DIAGNOSIS — Z00.00 ENCOUNTER FOR ANNUAL HEALTH EXAMINATION: Primary | ICD-10-CM

## 2023-03-09 DIAGNOSIS — E78.5 HYPERLIPIDEMIA, UNSPECIFIED HYPERLIPIDEMIA TYPE: ICD-10-CM

## 2023-03-09 PROBLEM — B02.29 PHN (POSTHERPETIC NEURALGIA): Status: RESOLVED | Noted: 2022-08-16 | Resolved: 2023-03-09

## 2023-03-09 PROBLEM — H83.02 INFECTION OF LEFT INNER EAR: Status: RESOLVED | Noted: 2022-07-28 | Resolved: 2023-03-09

## 2023-03-09 PROCEDURE — 99396 PREV VISIT EST AGE 40-64: CPT | Performed by: INTERNAL MEDICINE

## 2023-03-09 PROCEDURE — 99214 OFFICE O/P EST MOD 30 MIN: CPT | Performed by: INTERNAL MEDICINE

## 2023-03-09 RX ORDER — HYDROCHLOROTHIAZIDE 25 MG/1
25 TABLET ORAL EVERY MORNING
Start: 2023-03-09

## 2023-03-09 RX ORDER — FLUTICASONE FUROATE AND VILANTEROL 100; 25 UG/1; UG/1
1 POWDER RESPIRATORY (INHALATION) EVERY MORNING
Start: 2023-03-09

## 2023-03-09 RX ORDER — OMEPRAZOLE 20 MG/1
20 CAPSULE, DELAYED RELEASE ORAL
Start: 2023-03-09

## 2023-03-09 RX ORDER — DAPAGLIFLOZIN 10 MG/1
10 TABLET, FILM COATED ORAL EVERY MORNING
Start: 2023-03-09

## 2023-03-09 RX ORDER — CANDESARTAN 32 MG/1
32 TABLET ORAL EVERY MORNING
Start: 2023-03-09

## 2023-03-09 RX ORDER — METOPROLOL SUCCINATE 100 MG/1
100 TABLET, EXTENDED RELEASE ORAL EVERY EVENING
Start: 2023-03-09

## 2023-03-09 RX ORDER — AMLODIPINE BESYLATE 10 MG/1
10 TABLET ORAL EVERY EVENING
Start: 2023-03-09 | End: 2024-03-08

## 2023-03-09 RX ORDER — FLUTICASONE PROPIONATE 50 MCG
2 SPRAY, SUSPENSION (ML) NASAL DAILY
Start: 2023-03-09

## 2023-03-09 NOTE — PROGRESS NOTES
"    I N T E R N A L  M E D I C I N E    J U N O H  K I M,  M D      ENCOUNTER DATE:  03/09/2023    Tori Urena / 52 y.o. / female    CHIEF COMPLAINT     Annual Exam, Diabetes, Hypertension, and Obesity      VITALS     Vitals:    03/09/23 1008   BP: 124/80   Pulse: 68   Temp: 97.3 °F (36.3 °C)   SpO2: 95%   Weight: 124 kg (274 lb)   Height: 162.6 cm (64.02\")       BP Readings from Last 3 Encounters:   03/09/23 124/80   09/13/22 144/99   08/03/22 130/90     Wt Readings from Last 3 Encounters:   03/09/23 124 kg (274 lb)   08/03/22 122 kg (268 lb)   07/28/22 124 kg (274 lb)      Body mass index is 47 kg/m².    Blood pressure readings recorded on patient flowsheet:  No flowsheet data found.       MEDICATIONS     Current Outpatient Medications on File Prior to Visit   Medication Sig Dispense Refill   • albuterol sulfate  (90 Base) MCG/ACT inhaler INL 2 PFS PO Q 6 H     • Cholecalciferol 25 MCG (1000 UT) capsule Take 1 capsule by mouth Daily.     • meclizine (ANTIVERT) 12.5 MG tablet Take 1 tablet by mouth 2 (two) times a day. 14 tablet 0   • naproxen sodium (Aleve) 220 MG tablet 2 tabs twice daily x 3 days, then 1 tab twice daily x 10 days.     • Ozempic, 0.25 or 0.5 MG/DOSE, 2 MG/1.5ML solution pen-injector INJECT 0.25 MG UNDER THE SKIN ONCE WEEKLY FOR 30 DAYS. THEN INCREASE TO 0.5 MG UNDER THE SKIN ONE TIME PER WEEK.     • SUMAtriptan (IMITREX) 50 MG tablet      • [DISCONTINUED] ADVAIR -21 MCG/ACT inhaler INL 2 PFS PO BID     • [DISCONTINUED] amLODIPine (NORVASC) 10 MG tablet Take 1 tablet by mouth Daily.     • [DISCONTINUED] Breo Ellipta 100-25 MCG/INH inhaler INHALE 1 PUFF BY MOUTH 1 TIME EACH DAY     • [DISCONTINUED] candesartan (ATACAND) 32 MG tablet Take 1 tablet by mouth Daily.     • [DISCONTINUED] Dapagliflozin Propanediol (Farxiga) 10 MG tablet Take 10 mg by mouth Daily.     • [DISCONTINUED] fluticasone (FLONASE) 50 MCG/ACT nasal spray SHAKE GENTLY. USE 2 SPRAYS IN EACH NOSTRIL EVERY DAY.   "   • [DISCONTINUED] hydroCHLOROthiazide (HYDRODIURIL) 25 MG tablet      • [DISCONTINUED] metoprolol succinate XL (TOPROL-XL) 100 MG 24 hr tablet Take  by mouth Daily.     • [DISCONTINUED] omeprazole (priLOSEC) 20 MG capsule TK ONE C PO D     • [DISCONTINUED] Voltaren 1 % gel gel APPLY 1 GRAM TO THE AFFECTED AREA TWICE DAILY AS DIRECTED     • [DISCONTINUED] cyclobenzaprine (FLEXERIL) 5 MG tablet Take 1 tablet by mouth 3 (Three) Times a Day As Needed for Muscle Spasms. 15 tablet 0   • [DISCONTINUED] montelukast (SINGULAIR) 10 MG tablet Take 1 tablet by mouth Every Night.       No current facility-administered medications on file prior to visit.         HISTORY OF PRESENT ILLNESS     Tori presents for annual health maintenance visit.  Previous primary care physician was based on [facility].  She would like for me to manage all her medical conditions including type 2 diabetes, hypertension, asthma.  She is currently on Ozempic 0.25 mg weekly without significant side effects.  She has lost less than 5 pounds since starting this medication upon chart review.  Most recent A1c level was more than a year ago and it was 7.0.  She is on a multidrug regimen for severe hypertension.  She has uncontrolled obstructive sleep apnea.  She has history of migraines which has been doing better as of late.  She has morbid obesity with BMI of 47 with current weight of 274 pounds.  Moderate persistent asthma stable on Breo inhaler.    · General health: multiple medical problems  · Lifestyle:  · Attempting to lose weight?: No   · Diet: eats decently  · Exercise: does not exercise  · Tobacco: Never used   · Alcohol: does not drink  · Work: Full-time  · Reproductive health:  · Sexually active?: Need to verify  · Sexual problems?: No problems  · Concern for STD?:  No    · Sees Gynecologist?: Yes   · Jayne/Postmenopausal?: Yes   · Domestic abuse concerns: No   · Depression Screening:      PHQ-2/PHQ-9 Depression Screening 4/7/2022   Retired  PHQ-9 Total Score -   Retired Total Score -   Little Interest or Pleasure in Doing Things 0-->not at all   Feeling Down, Depressed or Hopeless 0-->not at all   PHQ-9: Brief Depression Severity Measure Score 0         PHQ-2: 0 (Not depressed)   PHQ-9: 0 (Negative screening for depression)    Patient Care Team:  Tate Villaseñor MD as PCP - General (Internal Medicine)      ALLERGIES  Allergies   Allergen Reactions   • Pollen Extract-Tree Extract [Pollen Extract] Cough        PFSH:     The following portions of the patient's history were reviewed and updated as appropriate: Allergies / Current Medications / Past Medical History / Surgical History / Social History / Family History    PROBLEM LIST   Patient Active Problem List   Diagnosis   • Hypertension   • Migraine with aura and without status migrainosus, not intractable   • Morbid obesity with BMI of 45.0-49.9, adult (HCC)   • Type 2 diabetes mellitus without complication, without long-term current use of insulin (HCC)   • Moderate persistent asthma without complication   • JAS (obstructive sleep apnea)       PAST MEDICAL HISTORY  Past Medical History:   Diagnosis Date   • Asthma    • Hypertension    • Obesity    • PHN (postherpetic neuralgia) 8/16/2022   • Vertigo 11/23/2020       SURGICAL HISTORY  No past surgical history on file.    SOCIAL HISTORY  Social History     Socioeconomic History   • Marital status: Single   • Number of children: 0   Tobacco Use   • Smoking status: Never   • Smokeless tobacco: Never   Substance and Sexual Activity   • Alcohol use: No   • Sexual activity: Never       FAMILY HISTORY  Family History   Problem Relation Age of Onset   • Diabetes Mother    • Hypertension Mother    • Colon polyps Mother    • Sickle cell anemia Father    • Diabetes Maternal Grandmother    • Colon cancer Maternal Grandmother        IMMUNIZATION HISTORY  Immunization History   Administered Date(s) Administered   • COVID-19 (MODERNA) 1st, 2nd, 3rd Dose Only 02/01/2021,  02/01/2021, 03/05/2021, 03/05/2021, 11/13/2021   • COVID-19 (MODERNA) BIVALENT BOOSTER 12+YRS 09/24/2022   • Flu Vaccine Split Quad 09/25/2020   • FluLaval/Fluzone >6mos 09/25/2020   • FluMist 2-49yrs 11/15/2016   • Fluzone Quad >6mos (Multi-dose) 09/15/2020   • Hepatitis A 05/24/2019, 12/12/2019   • Hepatitis B 08/08/2005, 09/20/2005, 05/05/2006   • Influenza TIV (IM) 10/12/2015   • Influenza, Unspecified 12/12/2019, 09/19/2022   • MMR 09/23/2005, 11/01/2005   • PPD Test 09/09/2003, 08/08/2005, 08/17/2005, 09/20/2005   • Pneumococcal Polysaccharide (PPSV23) 01/01/2011         REVIEW OF SYSTEMS     Review of Systems   Constitutional: Negative.  Negative for unexpected weight change.   HENT: Negative.    Eyes: Negative.    Respiratory: Negative.         Stable plasma   Cardiovascular: Negative.    Gastrointestinal: Negative.  Negative for abdominal pain, constipation, diarrhea, nausea and vomiting.   Endocrine: Negative.    Genitourinary: Negative.    Musculoskeletal: Negative.    Skin: Negative.    Allergic/Immunologic: Negative.    Neurological: Negative.    Hematological: Negative.    Psychiatric/Behavioral: Negative.          PHYSICAL EXAMINATION     Physical Exam  Constitutional:       General: She is not in acute distress.     Appearance: She is well-developed. She is obese.   HENT:      Head: Normocephalic and atraumatic.   Eyes:      General: No scleral icterus.     Conjunctiva/sclera: Conjunctivae normal.      Pupils: Pupils are equal, round, and reactive to light.   Neck:      Thyroid: No thyroid mass or thyromegaly.      Trachea: No tracheal deviation.   Cardiovascular:      Rate and Rhythm: Normal rate and regular rhythm.      Pulses: Normal pulses.      Heart sounds: Normal heart sounds.   Pulmonary:      Effort: Pulmonary effort is normal.      Breath sounds: Normal breath sounds.   Abdominal:      General: There is no distension.      Comments: Protuberant    Genitourinary:     Comments: Deferred to  gyne/by patient unless specified otherwise.   Musculoskeletal:      Cervical back: Neck supple.      Right lower leg: No edema.      Left lower leg: No edema.   Skin:     General: Skin is warm.      Coloration: Skin is not jaundiced or pale.      Findings: No rash.   Neurological:      General: No focal deficit present.      Mental Status: She is alert and oriented to person, place, and time.      Cranial Nerves: No cranial nerve deficit.      Motor: No abnormal muscle tone.      Coordination: Coordination normal.   Psychiatric:         Mood and Affect: Mood normal.         Behavior: Behavior normal.         Thought Content: Thought content normal.         Judgment: Judgment normal.         REVIEWED DATA      Labs:    Lab Results   Component Value Date     08/03/2021    K 3.9 08/03/2021    CALCIUM 9.6 08/03/2021    AST 16 08/03/2021    ALT 20 08/03/2021    BUN 9 08/03/2021    CREATININE 0.83 08/03/2021    CREATININE 0.90 04/19/2017    CREATININE 0.86 04/19/2017    EGFRIFNONA >60 08/03/2021    EGFRIFAFRI >60 08/03/2021       Lab Results   Component Value Date    GLUCOSE 80 04/19/2017    HGBA1C 7.0 (A) 09/17/2021    HGBA1C 6.63 (H) 04/19/2017    TSH 2.040 04/19/2017    FREET4 1.18 04/19/2017       Lab Results   Component Value Date    .8 (H) 08/03/2021    HDL 52 08/03/2021    TRIG 86 08/03/2021    CHOLHDLRATIO 3 08/03/2021       No results found for: [id number]     Lab Results   Component Value Date    WBC 8.06 04/19/2017    HGB 11.1 (L) 04/19/2017    MCV 81.0 04/19/2017     04/19/2017       No results found for: PROTEIN, GLUCOSEU, BLOODU, NITRITEU, LEUKOCYTESUR     No results found for: HEPCVIRUSABY    Imaging:        Medical Tests:        ASSESSMENT & PLAN     ANNUAL WELLNESS EXAM / PHYSICAL     Other medical problems addressed today:  Problem List Items Addressed This Visit        High    Type 2 diabetes mellitus without complication, without long-term current use of insulin (HCC) (Chronic)     Current Assessment & Plan     This will be managed by me going forward.   Check A1c.     Increase Ozempic to 0.5 mg weekly. Plan to titrate upwards as tolerated every 4 weeks.   Continue Farxiga 10 mg every morning    Lab Results   Component Value Date    HGBA1C 7.0 (A) 09/17/2021    HGBA1C 6.63 (H) 04/19/2017    CREATININE 0.83 08/03/2021    .8 (H) 08/03/2021             Relevant Medications    Ozempic, 0.25 or 0.5 MG/DOSE, 2 MG/1.5ML solution pen-injector    candesartan (ATACAND) 32 MG tablet    dapagliflozin Propanediol (Farxiga) 10 MG tablet    Other Relevant Orders    Comprehensive Metabolic Panel    Hemoglobin A1c    Microalbumin / Creatinine Urine Ratio - Urine, Clean Catch       Medium    Hypertension (Chronic)    Current Assessment & Plan     Take candesartan 32 mg and hydrochlorothiazide 25 mg in the morning.  Take metoprolol succinate 100 mg and amlodipine 10 mg in the evening.  Monitor blood pressure readings regularly at home.    BP Readings from Last 3 Encounters:   03/09/23 124/80   09/13/22 144/99   08/03/22 130/90             Relevant Medications    hydroCHLOROthiazide (HYDRODIURIL) 25 MG tablet    candesartan (ATACAND) 32 MG tablet    metoprolol succinate XL (TOPROL-XL) 100 MG 24 hr tablet    amLODIPine (NORVASC) 10 MG tablet    Other Relevant Orders    Comprehensive Metabolic Panel    CBC & Differential    Morbid obesity with BMI of 45.0-49.9, adult (HCC) (Chronic)    Current Assessment & Plan     Increase Ozempic to 0.5 mg weekly.  Plan is to titrate the dose up as tolerated.    Wt Readings from Last 3 Encounters:   03/09/23 124 kg (274 lb)   08/03/22 122 kg (268 lb)   07/28/22 124 kg (274 lb)     Body mass index is 47 kg/m².                 Low    Moderate persistent asthma without complication (Chronic)    Current Assessment & Plan     Continue Breo 100-25 1 puff daily and albuterol as needed           Relevant Medications    albuterol sulfate  (90 Base) MCG/ACT inhaler     Fluticasone Furoate-Vilanterol (Breo Ellipta) 100-25 MCG/ACT aerosol powder     JAS (obstructive sleep apnea) (Chronic)    Current Assessment & Plan     Not using CPAP. Referral placed for sleep medicine in [place].          Relevant Orders    Ambulatory Referral to Sleep Medicine   Other Visit Diagnoses     Encounter for annual health examination    -  Primary    Morbid (severe) obesity due to excess calories (HCC)        Relevant Orders    TSH+Free T4    Allergic rhinitis, unspecified seasonality, unspecified trigger        Relevant Medications    fluticasone (FLONASE) 50 MCG/ACT nasal spray    Gastroesophageal reflux disease, unspecified whether esophagitis present        Relevant Medications    omeprazole (priLOSEC) 20 MG capsule    Vitamin D deficiency        Relevant Orders    Vitamin D,25-Hydroxy    Need for hepatitis C screening test        Relevant Orders    Hepatitis C Antibody    Hyperlipidemia, unspecified hyperlipidemia type        Relevant Orders    Lipid Panel With / Chol / HDL Ratio    Screening for colon cancer        Relevant Orders    Ambulatory Referral For Screening Colonoscopy    Family hx colonic polyps        Relevant Orders    Ambulatory Referral For Screening Colonoscopy          Summary/Discussion:     •     Next Appointment with me: 6/7/2023    Return in about 2 months (around 5/9/2023) for Reassess chronic medical problems.      HEALTHCARE MAINTENANCE ISSUES     Cancer Screening:  · Colon: Initial/Next screening at age: OVERDUE and ORDERED COLONOSCOPY  · Repeat colon cancer screening: every 5 years  · Breast: Recommended monthly self exams; annual professional exam  · Mammogram: every 1 year  · Cervical: 3 years  · Skin: Monthly self skin examination, annual exam by health professional  · Lung: Does not meet criteria for lung cancer screening.   · Other:    Screening Labs & Tests:  · Lab results reviewed & discussed with the patient or test orders placed today.  · EKG:  · CV Screening:  Lipid panel  · DEXA (65+ or postmenopausal with risk factors):   · HEP C (If born 3101-9[birth year], or risk factors): Ordered  · Other:     Immunization/Vaccinations (to be given today unless deferred by patient)  · Influenza: Patient had the flu shot this season  · Hepatitis A: Up to date  · Hepatitis B: Up to date  · Tetanus/Pertussis: Recommended here or at pharmacy  · Pneumococcal: Recommended here or at pharmacy  · Shingles: Recommended Shingrix at pharmacy  · COVID: Had the bivalent vaccine    Lifestyle Counseling:  · Lifestyle Modifications: Attempt to lose weight, Improve dietary compliance, Begin progressive aerobic exercise program 3-5 days a week, Maintain a low sugar/carbohydrate diet, Follow a low fat, low cholesterol diet, Maintain low sodium diet (< 3 gm) for blood pressure and Make dinner the lightest meal of day  · Safety Issues: Always wear seatbelt, Avoid texting while driving   · Use sunscreen, regular skin examination  · Recommended annual dental/vision examination.  · Emotional/Stress/Sleep: Reviewed and  given when appropriate      Health Maintenance   Topic Date Due   • COLORECTAL CANCER SCREENING  Never done   • TDAP/TD VACCINES (1 - Tdap) Never done   • Pneumococcal Vaccine 0-64 (2 - PCV) 01/01/2012   • HEPATITIS C SCREENING  Never done   • ANNUAL PHYSICAL  Never done   • DIABETIC FOOT EXAM  Never done   • DIABETIC EYE EXAM  Never done   • ZOSTER VACCINE (1 of 2) Never done   • HEMOGLOBIN A1C  03/17/2022   • URINE MICROALBUMIN  08/03/2022   • LIPID PANEL  03/09/2023   • MAMMOGRAM  12/08/2024   • PAP SMEAR  07/12/2025   • Hepatitis B  Completed   • COVID-19 Vaccine  Completed   • INFLUENZA VACCINE  Completed           *Examiner was wearing KN95 mask during the entire duration of the visit. Patient was masked the entire time. Minimum social distance of 6 ft maintained entire visit except if physical contact was necessary as documented.       Template created by James Villaseñor MD

## 2023-03-09 NOTE — ASSESSMENT & PLAN NOTE
This will be managed by me going forward.   Check A1c.     Increase Ozempic to 0.5 mg weekly. Plan to titrate upwards as tolerated every 4 weeks.   Continue Farxiga 10 mg every morning    Lab Results   Component Value Date    HGBA1C 7.0 (A) 09/17/2021    HGBA1C 6.63 (H) 04/19/2017    CREATININE 0.83 08/03/2021    .8 (H) 08/03/2021

## 2023-03-09 NOTE — ASSESSMENT & PLAN NOTE
Take candesartan 32 mg and hydrochlorothiazide 25 mg in the morning.  Take metoprolol succinate 100 mg and amlodipine 10 mg in the evening.  Monitor blood pressure readings regularly at home.    BP Readings from Last 3 Encounters:   03/09/23 124/80   09/13/22 144/99   08/03/22 130/90

## 2023-03-09 NOTE — ASSESSMENT & PLAN NOTE
Increase Ozempic to 0.5 mg weekly.  Plan is to titrate the dose up as tolerated.    Wt Readings from Last 3 Encounters:   03/09/23 124 kg (274 lb)   08/03/22 122 kg (268 lb)   07/28/22 124 kg (274 lb)     Body mass index is 47 kg/m².

## 2023-03-09 NOTE — PATIENT INSTRUCTIONS
** IMPORTANT MESSAGE FROM DR. MOSQUEDA **    In our office, your satisfaction is VERY important to us.     You may receive a survey from [employer] by mail or E-mail for you to provide feedback about your visit. This information is invaluable for me to know what we can do to improve our services.     I ask that you please take a few minutes to complete the survey and let us know how we are doing in serving your needs. (You may receive the survey more than once for multiple visits)    Thank You !    Dr. Mosqueda    _________________________________________________________________________________________________________________________      ** ADDITIONAL INSTRUCTION / REMINDERS FROM DR. MOSQUEDA **    PCV20 PNEUMOCOCCAL VACCINE  Tdap (Adacel) booster shot  Shingrix x 2

## 2023-03-10 LAB
25(OH)D3+25(OH)D2 SERPL-MCNC: 18.1 NG/ML (ref 30–100)
ALBUMIN SERPL-MCNC: 4.1 G/DL (ref 3.5–5.2)
ALBUMIN/CREAT UR: 8 MG/G CREAT (ref 0–29)
ALBUMIN/GLOB SERPL: 1.2 G/DL
ALP SERPL-CCNC: 90 U/L (ref 39–117)
ALT SERPL-CCNC: 26 U/L (ref 1–33)
AST SERPL-CCNC: 21 U/L (ref 1–32)
BASOPHILS # BLD AUTO: 0.03 10*3/MM3 (ref 0–0.2)
BASOPHILS NFR BLD AUTO: 0.4 % (ref 0–1.5)
BILIRUB SERPL-MCNC: 0.3 MG/DL (ref 0–1.2)
BUN SERPL-MCNC: 11 MG/DL (ref 6–20)
BUN/CREAT SERPL: 13.8 (ref 7–25)
CALCIUM SERPL-MCNC: 10 MG/DL (ref 8.6–10.5)
CHLORIDE SERPL-SCNC: 103 MMOL/L (ref 98–107)
CHOLEST SERPL-MCNC: 174 MG/DL (ref 0–200)
CHOLEST/HDLC SERPL: 3.7 {RATIO}
CO2 SERPL-SCNC: 27.9 MMOL/L (ref 22–29)
CREAT SERPL-MCNC: 0.8 MG/DL (ref 0.57–1)
CREAT UR-MCNC: 306 MG/DL
EGFRCR SERPLBLD CKD-EPI 2021: 88.2 ML/MIN/1.73
EOSINOPHIL # BLD AUTO: 0.09 10*3/MM3 (ref 0–0.4)
EOSINOPHIL NFR BLD AUTO: 1.3 % (ref 0.3–6.2)
ERYTHROCYTE [DISTWIDTH] IN BLOOD BY AUTOMATED COUNT: 14.6 % (ref 12.3–15.4)
GLOBULIN SER CALC-MCNC: 3.3 GM/DL
GLUCOSE SERPL-MCNC: 85 MG/DL (ref 65–99)
HBA1C MFR BLD: 6.1 % (ref 4.8–5.6)
HCT VFR BLD AUTO: 41 % (ref 34–46.6)
HCV IGG SERPL QL IA: NON REACTIVE
HDLC SERPL-MCNC: 47 MG/DL (ref 40–60)
HGB BLD-MCNC: 13 G/DL (ref 12–15.9)
IMM GRANULOCYTES # BLD AUTO: 0.03 10*3/MM3 (ref 0–0.05)
IMM GRANULOCYTES NFR BLD AUTO: 0.4 % (ref 0–0.5)
LDLC SERPL CALC-MCNC: 105 MG/DL (ref 0–100)
LYMPHOCYTES # BLD AUTO: 1.65 10*3/MM3 (ref 0.7–3.1)
LYMPHOCYTES NFR BLD AUTO: 23.9 % (ref 19.6–45.3)
MCH RBC QN AUTO: 27.7 PG (ref 26.6–33)
MCHC RBC AUTO-ENTMCNC: 31.7 G/DL (ref 31.5–35.7)
MCV RBC AUTO: 87.2 FL (ref 79–97)
MICROALBUMIN UR-MCNC: 25.4 UG/ML
MONOCYTES # BLD AUTO: 0.44 10*3/MM3 (ref 0.1–0.9)
MONOCYTES NFR BLD AUTO: 6.4 % (ref 5–12)
NEUTROPHILS # BLD AUTO: 4.66 10*3/MM3 (ref 1.7–7)
NEUTROPHILS NFR BLD AUTO: 67.6 % (ref 42.7–76)
NRBC BLD AUTO-RTO: 0 /100 WBC (ref 0–0.2)
PLATELET # BLD AUTO: 354 10*3/MM3 (ref 140–450)
POTASSIUM SERPL-SCNC: 3.7 MMOL/L (ref 3.5–5.2)
PROT SERPL-MCNC: 7.4 G/DL (ref 6–8.5)
RBC # BLD AUTO: 4.7 10*6/MM3 (ref 3.77–5.28)
SODIUM SERPL-SCNC: 141 MMOL/L (ref 136–145)
T4 FREE SERPL-MCNC: 1.26 NG/DL (ref 0.93–1.7)
TRIGL SERPL-MCNC: 121 MG/DL (ref 0–150)
TSH SERPL DL<=0.005 MIU/L-ACNC: 1.91 UIU/ML (ref 0.27–4.2)
VLDLC SERPL CALC-MCNC: 22 MG/DL (ref 5–40)
WBC # BLD AUTO: 6.9 10*3/MM3 (ref 3.4–10.8)

## 2023-03-13 DIAGNOSIS — E11.9 TYPE 2 DIABETES MELLITUS WITHOUT COMPLICATION, WITHOUT LONG-TERM CURRENT USE OF INSULIN: Primary | Chronic | ICD-10-CM

## 2023-03-13 DIAGNOSIS — E78.5 HYPERLIPIDEMIA, UNSPECIFIED HYPERLIPIDEMIA TYPE: ICD-10-CM

## 2023-03-13 NOTE — PROGRESS NOTES
CALL PATIENT with results:    1. Vitamin D is very low   - start vitamin D 50,000 IU weekly x 4 months (then 2000 IU vitamin D3 over-the-counter daily long term for maintenance     2. LDL (bad cholesterol) remains elevated  - start atorvastatin 10 mg nightly (hyperlipidemia)  - recheck labs 1 week prior to followup if possible     3. A1c level for blood sugar average is lower. Increase Ozempic to 0.5 mg with plan to titrate further in 1 month if tolerated for additional weight loss and A1c benefits.     4. Thyroid level is stable.     5. Labs for kidney, liver and electrolytes are stable. Potassium is on the lower side of normal.   - May consider decreasing hydrochlorothiazide dose next time if blood pressure allows.     6. Complete blood cell counts normal     7. Urinalysis is negative for protein     8. Hep C screen is negative

## 2023-04-17 DIAGNOSIS — E11.9 TYPE 2 DIABETES MELLITUS WITHOUT COMPLICATION, WITHOUT LONG-TERM CURRENT USE OF INSULIN: Primary | Chronic | ICD-10-CM

## 2023-04-17 NOTE — TELEPHONE ENCOUNTER
----- Message from Tori Urena sent at 4/17/2023  3:27 PM EDT -----  Regarding: Ozempic 2mg  Contact: [phone]  Need new prescription sent in to the [place], [place] ( on file).    Thank you.

## 2023-04-20 RX ORDER — SEMAGLUTIDE 2.68 MG/ML
2 INJECTION, SOLUTION SUBCUTANEOUS WEEKLY
Qty: 3 ML | Refills: 2 | Status: SHIPPED | OUTPATIENT
Start: 2023-04-20

## 2023-04-20 RX ORDER — SEMAGLUTIDE 1.34 MG/ML
INJECTION, SOLUTION SUBCUTANEOUS
Status: CANCELLED | OUTPATIENT
Start: 2023-04-20

## 2023-04-20 NOTE — ASSESSMENT & PLAN NOTE
Increasing Ozempic to 2 mg weekly.     Lab Results   Component Value Date    HGBA1C 6.10 (H) 03/09/2023    HGBA1C 7.0 (A) 09/17/2021    HGBA1C 6.63 (H) 04/19/2017

## 2023-05-02 DIAGNOSIS — E11.9 TYPE 2 DIABETES MELLITUS WITHOUT COMPLICATION, WITHOUT LONG-TERM CURRENT USE OF INSULIN: Chronic | ICD-10-CM

## 2023-05-02 DIAGNOSIS — I10 PRIMARY HYPERTENSION: Chronic | ICD-10-CM

## 2023-05-02 DIAGNOSIS — E55.9 VITAMIN D DEFICIENCY: Primary | ICD-10-CM

## 2023-05-02 RX ORDER — HYDROCHLOROTHIAZIDE 25 MG/1
25 TABLET ORAL EVERY MORNING
Qty: 90 TABLET | Refills: 1 | Status: SHIPPED | OUTPATIENT
Start: 2023-05-02

## 2023-05-02 RX ORDER — CANDESARTAN 32 MG/1
32 TABLET ORAL EVERY MORNING
Qty: 90 TABLET | Refills: 1 | Status: SHIPPED | OUTPATIENT
Start: 2023-05-02

## 2023-05-02 RX ORDER — AMLODIPINE BESYLATE 10 MG/1
10 TABLET ORAL EVERY EVENING
Qty: 90 TABLET | Refills: 1 | Status: SHIPPED | OUTPATIENT
Start: 2023-05-02 | End: 2024-05-01

## 2023-05-02 RX ORDER — METOPROLOL SUCCINATE 100 MG/1
100 TABLET, EXTENDED RELEASE ORAL EVERY EVENING
Qty: 90 TABLET | Refills: 1 | Status: SHIPPED | OUTPATIENT
Start: 2023-05-02

## 2023-05-02 NOTE — TELEPHONE ENCOUNTER
----- Message from Tori Urena sent at 5/2/2023  3:40 PM EDT -----  Regarding: Refills needed  Contact: [phone]     METOPROLOL ER SUCCINATE 100MG TABS Qty: 90 day supply    Candesartan 32mg Tablets Qty: 90 day supply    Hydrochlorothiazide 25mg Tablets Qty: 90    Amlodipine Besylate 10mg Tablets Qty: 90    Vitamin D 1,000 Softgel Capsules Qty 90    Thank you.

## 2023-08-01 DIAGNOSIS — E11.9 TYPE 2 DIABETES MELLITUS WITHOUT COMPLICATION, WITHOUT LONG-TERM CURRENT USE OF INSULIN: Chronic | ICD-10-CM

## 2023-08-01 RX ORDER — SEMAGLUTIDE 2.68 MG/ML
2 INJECTION, SOLUTION SUBCUTANEOUS WEEKLY
Qty: 3 ML | Refills: 0 | Status: SHIPPED | OUTPATIENT
Start: 2023-08-01

## 2023-08-02 ENCOUNTER — OFFICE VISIT (OUTPATIENT)
Dept: INTERNAL MEDICINE | Age: 53
End: 2023-08-02
Payer: COMMERCIAL

## 2023-08-02 VITALS
OXYGEN SATURATION: 99 % | HEIGHT: 64 IN | SYSTOLIC BLOOD PRESSURE: 120 MMHG | WEIGHT: 273 LBS | HEART RATE: 99 BPM | BODY MASS INDEX: 46.61 KG/M2 | TEMPERATURE: 97.5 F | DIASTOLIC BLOOD PRESSURE: 82 MMHG

## 2023-08-02 DIAGNOSIS — E66.01 MORBID OBESITY WITH BMI OF 45.0-49.9, ADULT: Chronic | ICD-10-CM

## 2023-08-02 DIAGNOSIS — E78.5 HYPERLIPIDEMIA, UNSPECIFIED HYPERLIPIDEMIA TYPE: ICD-10-CM

## 2023-08-02 DIAGNOSIS — I10 PRIMARY HYPERTENSION: Chronic | ICD-10-CM

## 2023-08-02 DIAGNOSIS — E11.9 TYPE 2 DIABETES MELLITUS WITHOUT COMPLICATION, WITHOUT LONG-TERM CURRENT USE OF INSULIN: Primary | Chronic | ICD-10-CM

## 2023-08-22 DIAGNOSIS — E11.9 TYPE 2 DIABETES MELLITUS WITHOUT COMPLICATION, WITHOUT LONG-TERM CURRENT USE OF INSULIN: Chronic | ICD-10-CM

## 2023-08-22 RX ORDER — SEMAGLUTIDE 2.68 MG/ML
2 INJECTION, SOLUTION SUBCUTANEOUS WEEKLY
Qty: 9 ML | Refills: 0 | Status: SHIPPED | OUTPATIENT
Start: 2023-08-22

## 2023-08-22 NOTE — TELEPHONE ENCOUNTER
----- Message from Tate Villaseñor MD sent at 8/21/2023  6:22 PM EDT -----  Regarding: FW: Refill  Contact: [phone]  Send order  ----- Message -----  From: Nayeli Shah MA  Sent: 8/21/2023   9:07 AM EDT  To: Tate Villaseñor MD  Subject: FW: Refill                                         ----- Message -----  From: Tori Urena  Sent: 8/19/2023   6:47 PM EDT  To: [facility]  Subject: Refill                                           Ozempic 2mg Per Dose (1x8mg Pen)        Rx# [id number] (90) day supply, please send to  [place] [phone]. [phone].     Thank you,  Tori

## 2023-10-19 DIAGNOSIS — I10 PRIMARY HYPERTENSION: Chronic | ICD-10-CM

## 2023-10-19 DIAGNOSIS — E11.9 TYPE 2 DIABETES MELLITUS WITHOUT COMPLICATION, WITHOUT LONG-TERM CURRENT USE OF INSULIN: Chronic | ICD-10-CM

## 2023-10-20 RX ORDER — CANDESARTAN 32 MG/1
32 TABLET ORAL EVERY MORNING
Qty: 90 TABLET | Refills: 1 | Status: SHIPPED | OUTPATIENT
Start: 2023-10-20

## 2023-10-20 RX ORDER — DAPAGLIFLOZIN 10 MG/1
10 TABLET, FILM COATED ORAL EVERY MORNING
Qty: 90 TABLET | Refills: 1 | Status: SHIPPED | OUTPATIENT
Start: 2023-10-20

## 2023-11-20 LAB
CHOLEST SERPL-MCNC: 179 MG/DL (ref 0–200)
CHOLEST/HDLC SERPL: 3.51 {RATIO}
HBA1C MFR BLD: 6.1 % (ref 4.8–5.6)
HDLC SERPL-MCNC: 51 MG/DL (ref 40–60)
LDLC SERPL CALC-MCNC: 111 MG/DL (ref 0–100)
TRIGL SERPL-MCNC: 91 MG/DL (ref 0–150)
VLDLC SERPL CALC-MCNC: 17 MG/DL (ref 5–40)

## 2023-11-22 ENCOUNTER — OFFICE VISIT (OUTPATIENT)
Dept: INTERNAL MEDICINE | Age: 53
End: 2023-11-22
Payer: COMMERCIAL

## 2023-11-22 VITALS
OXYGEN SATURATION: 99 % | HEART RATE: 75 BPM | TEMPERATURE: 97.3 F | HEIGHT: 64 IN | DIASTOLIC BLOOD PRESSURE: 82 MMHG | SYSTOLIC BLOOD PRESSURE: 120 MMHG | WEIGHT: 277 LBS | BODY MASS INDEX: 47.29 KG/M2

## 2023-11-22 DIAGNOSIS — E66.01 MORBID OBESITY WITH BMI OF 45.0-49.9, ADULT: Chronic | ICD-10-CM

## 2023-11-22 DIAGNOSIS — E11.9 TYPE 2 DIABETES MELLITUS WITHOUT COMPLICATION, WITHOUT LONG-TERM CURRENT USE OF INSULIN: Primary | Chronic | ICD-10-CM

## 2023-11-22 DIAGNOSIS — E78.00 PURE HYPERCHOLESTEROLEMIA: Chronic | ICD-10-CM

## 2023-11-22 DIAGNOSIS — Z12.11 SCREENING FOR COLON CANCER: ICD-10-CM

## 2023-11-22 RX ORDER — TIRZEPATIDE 10 MG/.5ML
10 INJECTION, SOLUTION SUBCUTANEOUS WEEKLY
Qty: 2 ML | Refills: 0 | Status: SHIPPED | OUTPATIENT
Start: 2023-11-22

## 2023-11-22 RX ORDER — ATORVASTATIN CALCIUM 10 MG/1
10 TABLET, FILM COATED ORAL NIGHTLY
Qty: 30 TABLET | Refills: 3 | Status: SHIPPED | OUTPATIENT
Start: 2023-11-22

## 2023-11-22 NOTE — LETTER
[facility]  Vaccine Consent Form    Patient Name:  Tori Urena  Patient :  1970     Vaccine(s) Ordered    Tdap Vaccine Greater Than or Equal To 8yo IM        Screening Checklist  The following questions should be completed prior to vaccination. If you answer “yes” to any question, it does not necessarily mean you should not be vaccinated. It just means we may need to clarify or ask more questions. If a question is unclear, please ask your healthcare provider to explain it.    Yes No   Any fever or moderate to severe illness today (mild illness and/or antibiotic treatment are not contraindications)?     Do you have a history of a serious reaction to any previous vaccinations, such as anaphylaxis, encephalopathy within 7 days, Guillain-Buffalo syndrome within 6 weeks, seizure?     Have you received any live vaccine(s) (e.g MMR, JESSY) or any other vaccines in the last month (to ensure duplicate doses aren't given)?     Do you have an anaphylactic allergy to latex (DTaP, DTaP-IPV, Hep A, Hep B, MenB, RV, Td, Tdap), baker’s yeast (Hep B, HPV), polysorbates (RSV, nirsevimab, PCV 20, Rotavirrus, Tdap, Shingrix), or gelatin (JESSY, MMR)?     Do you have an anaphylactic allergy to neomycin (Rabies, JESSY, MMR, IPV, Hep A), polymyxin B (IPV), or streptomycin (IPV)?      Any cancer, leukemia, AIDS, or other immune system disorder? (JESSY, MMR, RV)     Do you have a parent, brother, or sister with an immune system problem (if immune competence of vaccine recipient clinically verified, can proceed)? (MMR, JESSY)     Any recent steroid treatments for >2 weeks, chemotherapy, or radiation treatment? (JESSY, MMR)     Have you received antibody-containing blood transfusions or IVIG in the past 11 months (recommended interval is dependent on product)? (MMR, JESSY)     Have you taken antiviral drugs (acyclovir, famciclovir, valacyclovir for JESSY) in the last 24 or 48 hours, respectively?      Are you pregnant or planning to become  pregnant within 1 month? (JESSY, MMR, HPV, IPV, MenB, Abrexvy; For Hep B- refer to Engerix-B; For RSV - Abrysvo is indicated for 32-36 weeks of pregnancy from September to January)     For infants, have you ever been told your child has had intussusception or a medical emergency involving obstruction of the intestine (Rotavirus)? If not for an infant, can skip this question.         *Ordering Physician/APC should be consulted if “yes” is checked by the patient or guardian above.      I have received, read, and understand the Vaccine Information Statement (VIS) for each vaccine ordered above.  I have considered my health status as well as the health status of my close contacts.  I have taken the opportunity to discuss my vaccine questions with my health care provider.   I have requested that the ordered vaccine(s) be given to me.  I understand the benefits and risks of the vaccines.  I understand that I should remain in the clinic for 15 minutes after receiving the vaccine(s).  _________________________________________________________  Signature of Patient or Parent/Legal Guardian ____________________  Date

## 2023-11-22 NOTE — PROGRESS NOTES
I N T E R N A L  M E D I C I N E    J U N O H  K I M,  M D      ENCOUNTER DATE:  11/22/2023    Tori Urena / 53 y.o. / female    CHIEF COMPLAINT / REASON FOR OFFICE VISIT     Diabetes, Obesity, Hypertension, and Hyperlipidemia      ASSESSMENT & PLAN     Problem List Items Addressed This Visit          High    Type 2 diabetes mellitus without complication, without long-term current use of insulin (HCC) - Primary (Chronic)    Current Assessment & Plan     Lab Results   Component Value Date    HGBA1C 6.10 (H) 11/20/2023    HGBA1C 6.10 (H) 03/09/2023    HGBA1C 7.0 (A) 09/17/2021    CREATININE 0.80 03/09/2023     (H) 11/20/2023    MALBCRERATIO 8 03/09/2023      Wt Readings from Last 3 Encounters:   11/22/23 126 kg (277 lb)   08/02/23 124 kg (273 lb)   03/09/23 124 kg (274 lb)   Body mass index is 47.52 kg/m².     Diabetes is well controlled but no significant weight change noted.  Discontinue Ozempic 2 mg.  Start Mounjaro 10 mg weekly with intent to titrate monthly up to 15 mg.   Follow-up 3 months with labs prior to follow-up     Advised to watch for nausea/vomiting, abdominal pain, constipation, and diarrhea.           Relevant Medications    dapagliflozin Propanediol (Farxiga) 10 MG tablet    candesartan (ATACAND) 32 MG tablet    Tirzepatide (Mounjaro) 10 MG/0.5ML solution pen-injector    Other Relevant Orders    Hemoglobin A1c    Microalbumin / Creatinine Urine Ratio - Urine, Clean Catch    Hyperlipidemia (Chronic)    Current Assessment & Plan     Lab Results   Component Value Date     (H) 11/20/2023     (H) 03/09/2023    .8 (H) 08/03/2021    TRIG 91 11/20/2023    CHOLHDLRATIO 3.51 11/20/2023   Agreeable to starting statin therapy.   Start atorvastatin 10 mg nightly.   Check lab prior to follow-up in 3 months.          Relevant Medications    atorvastatin (LIPITOR) 10 MG tablet    Other Relevant Orders    Comprehensive Metabolic Panel    Lipid Panel With / Chol / HDL Ratio        "Medium    Morbid obesity with BMI of 45.0-49.9, adult (HCC) (Chronic)    Current Assessment & Plan     Wt Readings from Last 3 Encounters:   11/22/23 126 kg (277 lb)   08/02/23 124 kg (273 lb)   03/09/23 124 kg (274 lb)   Body mass index is 47.52 kg/m².     Change Ozempic 2 mg to Mounjaro 10 mg weekly with intent to titrate dose monthly up to 15 mg.     Advised to watch for nausea/vomiting, abdominal pain, constipation, and diarrhea.            Other Visit Diagnoses       Screening for colon cancer        Relevant Orders    Cologuard - Stool, Per Rectum          Orders Placed This Encounter   Procedures    Tdap Vaccine Greater Than or Equal To 8yo IM    Comprehensive Metabolic Panel    Hemoglobin A1c    Lipid Panel With / Chol / HDL Ratio    Microalbumin / Creatinine Urine Ratio - Urine, Clean Catch    Cologuard - Stool, Per Rectum     New Medications Ordered This Visit   Medications    Tirzepatide (Mounjaro) 10 MG/0.5ML solution pen-injector     Sig: Inject 0.5 mL under the skin into the appropriate area as directed 1 (One) Time Per Week.     Dispense:  2 mL     Refill:  0    atorvastatin (LIPITOR) 10 MG tablet     Sig: Take 1 tablet by mouth Every Night.     Dispense:  30 tablet     Refill:  3       SUMMARY/DISCUSSION        Next Appointment with me: Visit date not found    Return in about 3 months (around 2/22/2024) for Diabetes, Hyperlipidemia, Obesity, FASTING LABS 1 WEEK PRIOR TO FOLLOWUP.        VITAL SIGNS     Vitals:    11/22/23 1358   BP: 120/82   Pulse: 75   Temp: 97.3 °F (36.3 °C)   SpO2: 99%   Weight: 126 kg (277 lb)   Height: 162.6 cm (64.02\")       BP Readings from Last 3 Encounters:   11/22/23 120/82   08/02/23 120/82   03/09/23 124/80     Wt Readings from Last 3 Encounters:   11/22/23 126 kg (277 lb)   08/02/23 124 kg (273 lb)   03/09/23 124 kg (274 lb)     Body mass index is 47.52 kg/m².    Blood pressure readings recorded on patient flowsheet:       No data to display                MEDICATIONS AT " THE TIME OF OFFICE VISIT     Current Outpatient Medications on File Prior to Visit   Medication Sig    albuterol sulfate  (90 Base) MCG/ACT inhaler INL 2 PFS PO Q 6 H    amLODIPine (NORVASC) 10 MG tablet Take 1 tablet by mouth Every Evening.    candesartan (ATACAND) 32 MG tablet Take 1 tablet by mouth Every Morning.    Cholecalciferol 25 MCG (1000 UT) capsule Take 1 capsule by mouth Daily.    dapagliflozin Propanediol (Farxiga) 10 MG tablet Take 10 mg by mouth Every Morning.    fluticasone (FLONASE) 50 MCG/ACT nasal spray 2 sprays into the nostril(s) as directed by provider Daily.    Fluticasone Furoate-Vilanterol (Breo Ellipta) 100-25 MCG/ACT aerosol powder  Inhale 1 puff Every Morning.    hydroCHLOROthiazide (HYDRODIURIL) 25 MG tablet Take 1 tablet by mouth Every Morning.    meclizine (ANTIVERT) 12.5 MG tablet Take 1 tablet by mouth 2 (two) times a day.    metoprolol succinate XL (TOPROL-XL) 100 MG 24 hr tablet Take 1 tablet by mouth Every Evening.    omeprazole (priLOSEC) 20 MG capsule Take 1 capsule by mouth Every Morning Before Breakfast.    [DISCONTINUED] Semaglutide, 2 MG/DOSE, (Ozempic, 2 MG/DOSE,) 8 MG/3ML solution pen-injector Inject 2 mg under the skin into the appropriate area as directed 1 (One) Time Per Week.    SUMAtriptan (IMITREX) 50 MG tablet  (Patient not taking: Reported on 11/22/2023)     No current facility-administered medications on file prior to visit.         HISTORY OF PRESENT ILLNESS     Currently on Ozempic 2 mg weekly for management of diabetes; however, her weight has increased slightly. Her last dose of Ozempic was on 11/19/2023. Her hemoglobin A1c has remained essentially stable.    Previously discussed diet modification due to elevated LDL. Deferred starting a statin drug at that time. Recent LDL increased from 105 mg/dL to 111 mg/dL. She is agreeable to start a statin drug today.     Lab Results   Component Value Date    HGBA1C 6.10 (H) 11/20/2023    HGBA1C 6.10 (H)  03/09/2023    HGBA1C 7.0 (A) 09/17/2021    CREATININE 0.80 03/09/2023     (H) 11/20/2023    MALBCRERATIO 8 03/09/2023     Blood sugar readings recorded on patient's flowsheet:       No data to display               126 kg (277 lb)    REVIEW OF SYSTEMS     GI negative       PHYSICAL EXAMINATION     Physical Exam  Alert with normal thought and judgment.   Morbidly obese       REVIEWED DATA     Labs:       Lab Results   Component Value Date     03/09/2023    K 3.7 03/09/2023    CALCIUM 10.0 03/09/2023    AST 21 03/09/2023    ALT 26 03/09/2023    BUN 11 03/09/2023    CREATININE 0.80 03/09/2023    CREATININE 0.83 08/03/2021    CREATININE 0.90 04/19/2017    EGFRIFNONA >60 08/03/2021    EGFRIFAFRI >60 08/03/2021       Lab Results   Component Value Date    HGBA1C 6.10 (H) 11/20/2023    HGBA1C 6.10 (H) 03/09/2023    HGBA1C 7.0 (A) 09/17/2021       Lab Results   Component Value Date     (H) 11/20/2023     (H) 03/09/2023    .8 (H) 08/03/2021    HDL 51 11/20/2023    HDL 47 03/09/2023    TRIG 91 11/20/2023    TRIG 121 03/09/2023       Lab Results   Component Value Date    TSH 1.910 03/09/2023    TSH 2.040 04/19/2017    FREET4 1.26 03/09/2023    FREET4 1.18 04/19/2017       Lab Results   Component Value Date    WBC 6.90 03/09/2023    HGB 13.0 03/09/2023     03/09/2023       Lab Results   Component Value Date    MALBCRERATIO 8 03/09/2023            Imaging:           Medical Tests:           Summary of old records / correspondence / consultant report:           Request outside records:       Transcribed from ambient dictation for Tate Villaseñor MD by Balbina Araiza.  11/22/23   14:33 EST    Patient or patient representative verbalized consent to the visit recording.  I have personally performed the services described in this document as transcribed by the above individual, and it is both accurate and complete.  Tate Villaseñor MD  11/22/2023  17:13 EST

## 2023-11-22 NOTE — ASSESSMENT & PLAN NOTE
Lab Results   Component Value Date    HGBA1C 6.10 (H) 11/20/2023    HGBA1C 6.10 (H) 03/09/2023    HGBA1C 7.0 (A) 09/17/2021    CREATININE 0.80 03/09/2023     (H) 11/20/2023    MALBCRERATIO 8 03/09/2023      Wt Readings from Last 3 Encounters:   11/22/23 126 kg (277 lb)   08/02/23 124 kg (273 lb)   03/09/23 124 kg (274 lb)     Body mass index is 47.52 kg/m².     Diabetes is well controlled but no significant weight change noted.  Discontinue Ozempic 2 mg.  Start Mounjaro 10 mg weekly with intent to titrate monthly up to 15 mg.   Follow-up 3 months with labs prior to follow-up     Advised to watch for nausea/vomiting, abdominal pain, constipation, and diarrhea.

## 2023-11-22 NOTE — ASSESSMENT & PLAN NOTE
Wt Readings from Last 3 Encounters:   11/22/23 126 kg (277 lb)   08/02/23 124 kg (273 lb)   03/09/23 124 kg (274 lb)     Body mass index is 47.52 kg/m².     Change Ozempic 2 mg to Mounjaro 10 mg weekly with intent to titrate dose monthly up to 15 mg.     Advised to watch for nausea/vomiting, abdominal pain, constipation, and diarrhea.

## 2023-11-22 NOTE — ASSESSMENT & PLAN NOTE
Lab Results   Component Value Date     (H) 11/20/2023     (H) 03/09/2023    .8 (H) 08/03/2021    TRIG 91 11/20/2023    CHOLHDLRATIO 3.51 11/20/2023     Agreeable to starting statin therapy.   Start atorvastatin 10 mg nightly.   Check lab prior to follow-up in 3 months.

## 2023-12-06 DIAGNOSIS — I10 PRIMARY HYPERTENSION: Chronic | ICD-10-CM

## 2023-12-06 RX ORDER — METOPROLOL SUCCINATE 100 MG/1
100 TABLET, EXTENDED RELEASE ORAL EVERY EVENING
Qty: 90 TABLET | Refills: 1 | Status: SHIPPED | OUTPATIENT
Start: 2023-12-06

## 2023-12-26 DIAGNOSIS — E11.9 TYPE 2 DIABETES MELLITUS WITHOUT COMPLICATION, WITHOUT LONG-TERM CURRENT USE OF INSULIN: Chronic | ICD-10-CM

## 2024-01-21 DIAGNOSIS — E11.9 TYPE 2 DIABETES MELLITUS WITHOUT COMPLICATION, WITHOUT LONG-TERM CURRENT USE OF INSULIN: Chronic | ICD-10-CM

## 2024-01-21 DIAGNOSIS — I10 PRIMARY HYPERTENSION: Chronic | ICD-10-CM

## 2024-01-22 RX ORDER — HYDROCHLOROTHIAZIDE 25 MG/1
25 TABLET ORAL EVERY MORNING
Qty: 90 TABLET | Refills: 1 | Status: SHIPPED | OUTPATIENT
Start: 2024-01-22

## 2024-01-22 RX ORDER — AMLODIPINE BESYLATE 10 MG/1
10 TABLET ORAL EVERY EVENING
Qty: 90 TABLET | Refills: 1 | Status: SHIPPED | OUTPATIENT
Start: 2024-01-22 | End: 2025-01-21

## 2024-01-22 RX ORDER — TIRZEPATIDE 12.5 MG/.5ML
INJECTION, SOLUTION SUBCUTANEOUS
Qty: 2 ML | Refills: 0 | Status: SHIPPED | OUTPATIENT
Start: 2024-01-22

## 2024-02-22 ENCOUNTER — OFFICE VISIT (OUTPATIENT)
Dept: INTERNAL MEDICINE | Age: 54
End: 2024-02-22
Payer: COMMERCIAL

## 2024-02-22 VITALS
SYSTOLIC BLOOD PRESSURE: 122 MMHG | OXYGEN SATURATION: 98 % | HEART RATE: 74 BPM | BODY MASS INDEX: 46.44 KG/M2 | WEIGHT: 272 LBS | DIASTOLIC BLOOD PRESSURE: 92 MMHG | HEIGHT: 64 IN | TEMPERATURE: 97.1 F

## 2024-02-22 DIAGNOSIS — E11.9 TYPE 2 DIABETES MELLITUS WITHOUT COMPLICATION, WITHOUT LONG-TERM CURRENT USE OF INSULIN: Primary | Chronic | ICD-10-CM

## 2024-02-22 DIAGNOSIS — E11.9 TYPE 2 DIABETES MELLITUS WITHOUT COMPLICATION, WITHOUT LONG-TERM CURRENT USE OF INSULIN: Chronic | ICD-10-CM

## 2024-02-22 DIAGNOSIS — E66.01 MORBID OBESITY WITH BMI OF 45.0-49.9, ADULT: Chronic | ICD-10-CM

## 2024-02-22 DIAGNOSIS — E78.00 PURE HYPERCHOLESTEROLEMIA: Chronic | ICD-10-CM

## 2024-02-22 DIAGNOSIS — I10 PRIMARY HYPERTENSION: Chronic | ICD-10-CM

## 2024-02-22 NOTE — PROGRESS NOTES
I N T E R N A L  M E D I C I N E    J U N O H  K I M,  M D      ENCOUNTER DATE:  02/22/2024    Tori Urena / 53 y.o. / female    CHIEF COMPLAINT / REASON FOR OFFICE VISIT     Diabetes and Hyperlipidemia      ASSESSMENT & PLAN     Problem List Items Addressed This Visit          High    Type 2 diabetes mellitus without complication, without long-term current use of insulin - Primary (Chronic)    Current Assessment & Plan     Taking Mounjaro 12.5 mg weekly without significant side effects.   Mild weight loss noted.     Had labs this morning. Will get back to her with results.   Continue current medications.   No carbohydrate dinner  Walk 15 min after dinner     Follow-up 3 months          Relevant Medications    dapagliflozin Propanediol (Farxiga) 10 MG tablet    candesartan (ATACAND) 32 MG tablet    Tirzepatide (Mounjaro) 12.5 MG/0.5ML solution pen-injector pen    Hyperlipidemia (Chronic)    Current Assessment & Plan     Taking atorvastatin 10 mg without problems.   Will follow-up with labs from today.          Relevant Medications    atorvastatin (LIPITOR) 10 MG tablet       Medium    Hypertension (Chronic)    Overview     Continue:   Candesartan 32 mg AM   HCTZ 25 mg AM  Amlodipine 10 mg PM   Metoprolol succinate 100 mg PM         Relevant Medications    candesartan (ATACAND) 32 MG tablet    metoprolol succinate XL (TOPROL-XL) 100 MG 24 hr tablet    hydroCHLOROthiazide (HYDRODIURIL) 25 MG tablet    amLODIPine (NORVASC) 10 MG tablet    Morbid obesity with BMI of 45.0-49.9, adult (Chronic)    Current Assessment & Plan     Wt Readings from Last 3 Encounters:   02/22/24 123 kg (272 lb)   11/22/23 126 kg (277 lb)   08/02/23 124 kg (273 lb)   Body mass index is 46.66 kg/m².     Weight loss of 5 lbs since switch to Mounjaro.   Continue Mounjaro 12.5 mg weekly.   No carbohydrate dinner.  Walk 15 min after dinner.           No orders of the defined types were placed in this encounter.    No orders of the defined  "types were placed in this encounter.      SUMMARY/DISCUSSION        Next Appointment with me: Visit date not found    Return in about 3 months (around 5/22/2024) for Reassess chronic medical problems.        VITAL SIGNS     Vitals:    02/22/24 1118   BP: 122/92   Pulse: 74   Temp: 97.1 °F (36.2 °C)   SpO2: 98%   Weight: 123 kg (272 lb)   Height: 162.6 cm (64.02\")       BP Readings from Last 3 Encounters:   02/22/24 122/92   11/22/23 120/82   08/02/23 120/82     Wt Readings from Last 3 Encounters:   02/22/24 123 kg (272 lb)   11/22/23 126 kg (277 lb)   08/02/23 124 kg (273 lb)     Body mass index is 46.66 kg/m².    Blood pressure readings recorded on patient flowsheet:       No data to display                MEDICATIONS AT THE TIME OF OFFICE VISIT     Current Outpatient Medications on File Prior to Visit   Medication Sig    albuterol sulfate  (90 Base) MCG/ACT inhaler INL 2 PFS PO Q 6 H    amLODIPine (NORVASC) 10 MG tablet TAKE 1 TABLET BY MOUTH EVERY EVENING    atorvastatin (LIPITOR) 10 MG tablet Take 1 tablet by mouth Every Night.    candesartan (ATACAND) 32 MG tablet Take 1 tablet by mouth Every Morning.    Cholecalciferol 25 MCG (1000 UT) capsule Take 1 capsule by mouth Daily.    dapagliflozin Propanediol (Farxiga) 10 MG tablet Take 10 mg by mouth Every Morning.    fluticasone (FLONASE) 50 MCG/ACT nasal spray 2 sprays into the nostril(s) as directed by provider Daily.    Fluticasone Furoate-Vilanterol (Breo Ellipta) 100-25 MCG/ACT aerosol powder  Inhale 1 puff Every Morning.    hydroCHLOROthiazide (HYDRODIURIL) 25 MG tablet TAKE 1 TABLET BY MOUTH EVERY MORNING    meclizine (ANTIVERT) 12.5 MG tablet Take 1 tablet by mouth 2 (two) times a day.    metoprolol succinate XL (TOPROL-XL) 100 MG 24 hr tablet TAKE 1 TABLET BY MOUTH EVERY EVENING    omeprazole (priLOSEC) 20 MG capsule Take 1 capsule by mouth Every Morning Before Breakfast.    SUMAtriptan (IMITREX) 50 MG tablet     Tirzepatide (Mounjaro) 12.5 MG/0.5ML " solution pen-injector pen ADMINISTER 12.5 MG UNDER THE SKIN 1 TIME EVERY WEEK AS DIRECTED     No current facility-administered medications on file prior to visit.         HISTORY OF PRESENT ILLNESS     Had labs drawn just this morning, pending results.     Tolerating Mounjaro 12.5 mg weekly without significant GI side effects. Weight loss of 5 lbs. Notices difference from Ozempic in terms of appetite control and qty of food she is eating.     Tolerating atorvastatin 10 mg without problems.     Hypertension remains stable on current medications.     Lab Results   Component Value Date    HGBA1C 6.10 (H) 11/20/2023    HGBA1C 6.10 (H) 03/09/2023    HGBA1C 7.0 (A) 09/17/2021    CREATININE 0.80 03/09/2023     (H) 11/20/2023    MALBCRERATIO 8 03/09/2023     Blood sugar readings recorded on patient's flowsheet:       No data to display               123 kg (272 lb)    REVIEW OF SYSTEMS     GI negative       PHYSICAL EXAMINATION     Physical Exam  Alert with normal thought and judgment.   Mild weight loss noted       REVIEWED DATA     Labs:       Lab Results   Component Value Date     03/09/2023    K 3.7 03/09/2023    CALCIUM 10.0 03/09/2023    AST 21 03/09/2023    ALT 26 03/09/2023    BUN 11 03/09/2023    CREATININE 0.80 03/09/2023    CREATININE 0.83 08/03/2021    CREATININE 0.90 04/19/2017    EGFRIFNONA >60 08/03/2021    EGFRIFAFRI >60 08/03/2021       Lab Results   Component Value Date    HGBA1C 6.10 (H) 11/20/2023    HGBA1C 6.10 (H) 03/09/2023    HGBA1C 7.0 (A) 09/17/2021       Lab Results   Component Value Date     (H) 11/20/2023     (H) 03/09/2023    .8 (H) 08/03/2021    HDL 51 11/20/2023    HDL 47 03/09/2023    TRIG 91 11/20/2023    TRIG 121 03/09/2023       Lab Results   Component Value Date    TSH 1.910 03/09/2023    TSH 2.040 04/19/2017    FREET4 1.26 03/09/2023    FREET4 1.18 04/19/2017       Lab Results   Component Value Date    WBC 6.90 03/09/2023    HGB 13.0 03/09/2023    PLT  354 03/09/2023       Lab Results   Component Value Date    MALBCRERATIO 8 03/09/2023            Imaging:           Medical Tests:           Summary of old records / correspondence / consultant report:           Request outside records:

## 2024-02-22 NOTE — ASSESSMENT & PLAN NOTE
Wt Readings from Last 3 Encounters:   02/22/24 123 kg (272 lb)   11/22/23 126 kg (277 lb)   08/02/23 124 kg (273 lb)     Body mass index is 46.66 kg/m².     Weight loss of 5 lbs since switch to Mounjaro.   Continue Mounjaro 12.5 mg weekly.   No carbohydrate dinner.  Walk 15 min after dinner.

## 2024-02-22 NOTE — ASSESSMENT & PLAN NOTE
Taking Mounjaro 12.5 mg weekly without significant side effects.   Mild weight loss noted.     Had labs this morning. Will get back to her with results.   Continue current medications.   No carbohydrate dinner  Walk 15 min after dinner     Follow-up 3 months

## 2024-03-05 DIAGNOSIS — E11.9 TYPE 2 DIABETES MELLITUS WITHOUT COMPLICATION, WITHOUT LONG-TERM CURRENT USE OF INSULIN: Chronic | ICD-10-CM

## 2024-03-07 RX ORDER — DAPAGLIFLOZIN 10 MG/1
10 TABLET, FILM COATED ORAL EVERY MORNING
Qty: 90 TABLET | Refills: 1 | Status: SHIPPED | OUTPATIENT
Start: 2024-03-07

## 2024-03-23 DIAGNOSIS — E78.00 PURE HYPERCHOLESTEROLEMIA: Chronic | ICD-10-CM

## 2024-03-25 RX ORDER — ATORVASTATIN CALCIUM 10 MG/1
10 TABLET, FILM COATED ORAL NIGHTLY
Qty: 30 TABLET | Refills: 5 | Status: SHIPPED | OUTPATIENT
Start: 2024-03-25

## 2024-04-02 RX ORDER — ALBUTEROL SULFATE 90 UG/1
2 AEROSOL, METERED RESPIRATORY (INHALATION) EVERY 6 HOURS PRN
Qty: 6.7 G | Refills: 2 | Status: SHIPPED | OUTPATIENT
Start: 2024-04-02

## 2024-05-07 ENCOUNTER — TELEPHONE (OUTPATIENT)
Dept: INTERNAL MEDICINE | Age: 54
End: 2024-05-07

## 2024-05-07 NOTE — TELEPHONE ENCOUNTER
Caller: Tori Urena    Relationship: Self    Best call back number: [phone]     What form or medical record are you requesting: PAPERWORK EXPLAINING THE PATIENT CAN WORK FROM HOME DUE TO AN AUTO IMMUNE DISORDER     Who is requesting this form or medical record from you: [employer]    How would you like to receive the form or medical records (pick-up, mail, fax): UPLOAD TO [employer]    Timeframe paperwork needed: ASAP

## 2024-05-20 ENCOUNTER — OFFICE VISIT (OUTPATIENT)
Dept: INTERNAL MEDICINE | Age: 54
End: 2024-05-20
Payer: COMMERCIAL

## 2024-05-20 VITALS
DIASTOLIC BLOOD PRESSURE: 100 MMHG | HEART RATE: 96 BPM | TEMPERATURE: 97.1 F | HEIGHT: 64 IN | BODY MASS INDEX: 44.9 KG/M2 | WEIGHT: 263 LBS | OXYGEN SATURATION: 99 % | SYSTOLIC BLOOD PRESSURE: 120 MMHG

## 2024-05-20 DIAGNOSIS — E11.9 TYPE 2 DIABETES MELLITUS WITHOUT COMPLICATION, WITHOUT LONG-TERM CURRENT USE OF INSULIN: Primary | Chronic | ICD-10-CM

## 2024-05-20 DIAGNOSIS — E78.00 PURE HYPERCHOLESTEROLEMIA: Chronic | ICD-10-CM

## 2024-05-20 DIAGNOSIS — J45.40 MODERATE PERSISTENT ASTHMA WITHOUT COMPLICATION: Chronic | ICD-10-CM

## 2024-05-20 DIAGNOSIS — I10 PRIMARY HYPERTENSION: Chronic | ICD-10-CM

## 2024-05-20 DIAGNOSIS — E66.01 MORBID OBESITY WITH BMI OF 45.0-49.9, ADULT: Chronic | ICD-10-CM

## 2024-05-20 PROCEDURE — 99214 OFFICE O/P EST MOD 30 MIN: CPT | Performed by: INTERNAL MEDICINE

## 2024-05-20 NOTE — ASSESSMENT & PLAN NOTE
Increase in asthma activity.  Advised to take Breo inhaler every day.  Continue albuterol HFA inhaler as needed.

## 2024-05-20 NOTE — ASSESSMENT & PLAN NOTE
Wt Readings from Last 3 Encounters:   05/20/24 119 kg (263 lb)   02/22/24 123 kg (272 lb)   11/22/23 126 kg (277 lb)     Body mass index is 45.12 kg/m².     Was out of Zepbound for 3-4 weeks due to shortage but recently resumed.   Continue Zepbound 12.5 mg weekly.   Maintain a low sugar/starch/carbohydrate diet.   Increase physical activity/exercise.

## 2024-05-20 NOTE — PROGRESS NOTES
I N T E R N A L  M E D I C I N E    J U N O H  K I M,  M D      ENCOUNTER DATE:  05/20/2024    Tori Urena / 53 y.o. / female    CHIEF COMPLAINT / REASON FOR OFFICE VISIT     Diabetes, Hyperlipidemia, Hypertension, and Obesity      ASSESSMENT & PLAN     Problem List Items Addressed This Visit          High    Type 2 diabetes mellitus without complication, without long-term current use of insulin - Primary (Chronic)    Current Assessment & Plan     Lab Results   Component Value Date    HGBA1C 5.70 (H) 02/22/2024    HGBA1C 6.10 (H) 11/20/2023    HGBA1C 6.10 (H) 03/09/2023    CREATININE 1.04 (H) 02/22/2024    LDL 86 02/22/2024    MALBCRERATIO 19 02/22/2024      Was not taking Zepbound for 3-4 weeks due to shortage but did resume recently.   Will check A1c on follow-up in 4 months.          Relevant Medications    candesartan (ATACAND) 32 MG tablet    Tirzepatide (Mounjaro) 12.5 MG/0.5ML solution pen-injector pen    dapagliflozin Propanediol (Farxiga) 10 MG tablet       Medium    Hypertension (Chronic)    Overview     Continue:   Candesartan 32 mg AM   HCTZ 25 mg AM  Amlodipine 10 mg PM   Metoprolol succinate 100 mg PM         Current Assessment & Plan     DBP remains high. Continue current medications for now. Maintain low sodium diet of less than 3 grams daily. Continue weight loss measures. Will need augment medication if still elevated on follow-up.     BP Readings from Last 3 Encounters:   05/20/24 120/100   02/22/24 122/92   11/22/23 120/82             Relevant Medications    candesartan (ATACAND) 32 MG tablet    metoprolol succinate XL (TOPROL-XL) 100 MG 24 hr tablet    hydroCHLOROthiazide (HYDRODIURIL) 25 MG tablet    amLODIPine (NORVASC) 10 MG tablet    Morbid obesity with BMI of 45.0-49.9, adult (Chronic)    Current Assessment & Plan     Wt Readings from Last 3 Encounters:   05/20/24 119 kg (263 lb)   02/22/24 123 kg (272 lb)   11/22/23 126 kg (277 lb)     Body mass index is 45.12 kg/m².     Was out  "of Zepbound for 3-4 weeks due to shortage but recently resumed.   Continue Zepbound 12.5 mg weekly.   Maintain a low sugar/starch/carbohydrate diet.   Increase physical activity/exercise.         Moderate persistent asthma without complication (Chronic)    Current Assessment & Plan     Increase in asthma activity.  Advised to take Breo inhaler every day.  Continue albuterol HFA inhaler as needed.         Relevant Medications    Fluticasone Furoate-Vilanterol (Breo Ellipta) 100-25 MCG/ACT aerosol powder     albuterol sulfate  (90 Base) MCG/ACT inhaler    Hyperlipidemia (Chronic)    Current Assessment & Plan     Lab Results   Component Value Date    LDL 86 02/22/2024     (H) 11/20/2023     (H) 03/09/2023    TRIG 105 02/22/2024    CHOLHDLRATIO 3.33 02/22/2024      Continue atorvastatin 10 mg         Relevant Medications    atorvastatin (LIPITOR) 10 MG tablet     No orders of the defined types were placed in this encounter.    No orders of the defined types were placed in this encounter.      SUMMARY/DISCUSSION        Next Appointment with me: Visit date not found    Return in about 4 months (around 9/20/2024) for Reassess chronic medical problems.        VITAL SIGNS     Vitals:    05/20/24 0907   BP: 120/100   Pulse: 96   Temp: 97.1 °F (36.2 °C)   SpO2: 99%   Weight: 119 kg (263 lb)   Height: 162.6 cm (64.02\")       BP Readings from Last 3 Encounters:   05/20/24 120/100   02/22/24 122/92   11/22/23 120/82     Wt Readings from Last 3 Encounters:   05/20/24 119 kg (263 lb)   02/22/24 123 kg (272 lb)   11/22/23 126 kg (277 lb)     Body mass index is 45.12 kg/m².    Blood pressure readings recorded on patient flowsheet:       No data to display                MEDICATIONS AT THE TIME OF OFFICE VISIT     Current Outpatient Medications on File Prior to Visit   Medication Sig    albuterol sulfate  (90 Base) MCG/ACT inhaler Inhale 2 puffs Every 6 (Six) Hours As Needed for Wheezing.    amLODIPine " (NORVASC) 10 MG tablet TAKE 1 TABLET BY MOUTH EVERY EVENING    atorvastatin (LIPITOR) 10 MG tablet TAKE 1 TABLET BY MOUTH EVERY NIGHT    candesartan (ATACAND) 32 MG tablet Take 1 tablet by mouth Every Morning.    Cholecalciferol 25 MCG (1000 UT) capsule Take 1 capsule by mouth Daily.    dapagliflozin Propanediol (Farxiga) 10 MG tablet Take 10 mg by mouth Every Morning.    fluticasone (FLONASE) 50 MCG/ACT nasal spray 2 sprays into the nostril(s) as directed by provider Daily.    Fluticasone Furoate-Vilanterol (Breo Ellipta) 100-25 MCG/ACT aerosol powder  Inhale 1 puff Every Morning.    hydroCHLOROthiazide (HYDRODIURIL) 25 MG tablet TAKE 1 TABLET BY MOUTH EVERY MORNING    meclizine (ANTIVERT) 12.5 MG tablet Take 1 tablet by mouth 2 (two) times a day.    metoprolol succinate XL (TOPROL-XL) 100 MG 24 hr tablet TAKE 1 TABLET BY MOUTH EVERY EVENING    omeprazole (priLOSEC) 20 MG capsule Take 1 capsule by mouth Every Morning Before Breakfast.    SUMAtriptan (IMITREX) 50 MG tablet     Tirzepatide (Mounjaro) 12.5 MG/0.5ML solution pen-injector pen Inject 0.5 mL under the skin into the appropriate area as directed 1 (One) Time Per Week.     No current facility-administered medications on file prior to visit.         HISTORY OF PRESENT ILLNESS     She was not taking Zepbound for 3 to 4 weeks due to supply issue.  She did resume Zepbound 12.5 mg recently.  Prior A1c was significantly lower at 5.7.  She is also on Farxiga 10 mg for diabetes.  Diastolic blood pressure remains elevated above 100 at home.  She is on candesartan, hydrochlorothiazide, amlodipine and metoprolol succinate for hypertension.  She reports compliance with her blood pressure medications.  Denies unusual chest pains or worsening headaches or vision change.  She is on atorvastatin 10 mg without problems for hyperlipidemia and LDL previously was lower.    Lab Results   Component Value Date    HGBA1C 5.70 (H) 02/22/2024    HGBA1C 6.10 (H) 11/20/2023    HGBA1C  6.10 (H) 03/09/2023    CREATININE 1.04 (H) 02/22/2024    LDL 86 02/22/2024    MALBCRERATIO 19 02/22/2024     Blood sugar readings recorded on patient's flowsheet:       No data to display               119 kg (263 lb)    Patient Care Team:  Tate Villaseñor MD as PCP - General (Internal Medicine)  eye [facility] (Ophthalmology)    REVIEW OF SYSTEMS     Constitutional neg except per HPI   Resp increase use of albuterol inhaler recently   CV neg   Neuro negative for change       PHYSICAL EXAMINATION     Physical Exam  Alert with normal thought and judgment.   Morbid obesity  Cardiovascular: Normal rate, regular rhythm.  Pulm/Chest: Effort normal, breath sounds normal.       REVIEWED DATA     Labs:       Lab Results   Component Value Date     02/22/2024    K 4.0 02/22/2024    CALCIUM 9.8 02/22/2024    AST 17 02/22/2024    ALT 22 02/22/2024    BUN 12 02/22/2024    CREATININE 1.04 (H) 02/22/2024    CREATININE 0.80 03/09/2023    CREATININE 0.83 08/03/2021    EGFRRESULT 64.0 02/22/2024       Lab Results   Component Value Date    HGBA1C 5.70 (H) 02/22/2024    HGBA1C 6.10 (H) 11/20/2023    HGBA1C 6.10 (H) 03/09/2023       Lab Results   Component Value Date    LDL 86 02/22/2024     (H) 11/20/2023     (H) 03/09/2023    HDL 45 02/22/2024    HDL 51 11/20/2023    TRIG 105 02/22/2024    TRIG 91 11/20/2023       Lab Results   Component Value Date    TSH 1.910 03/09/2023    TSH 2.040 04/19/2017    FREET4 1.26 03/09/2023    FREET4 1.18 04/19/2017       Lab Results   Component Value Date    WBC 6.90 03/09/2023    HGB 13.0 03/09/2023     03/09/2023       Lab Results   Component Value Date    MALBCRERATIO 19 02/22/2024         Imaging:           Medical Tests:           Summary of old records / correspondence / consultant report:           Request outside records:

## 2024-05-20 NOTE — ASSESSMENT & PLAN NOTE
Lab Results   Component Value Date    LDL 86 02/22/2024     (H) 11/20/2023     (H) 03/09/2023    TRIG 105 02/22/2024    CHOLHDLRATIO 3.33 02/22/2024      Continue atorvastatin 10 mg

## 2024-05-20 NOTE — ASSESSMENT & PLAN NOTE
DBP remains high. Continue current medications for now. Maintain low sodium diet of less than 3 grams daily. Continue weight loss measures. Will need augment medication if still elevated on follow-up.     BP Readings from Last 3 Encounters:   05/20/24 120/100   02/22/24 122/92   11/22/23 120/82

## 2024-05-20 NOTE — ASSESSMENT & PLAN NOTE
Lab Results   Component Value Date    HGBA1C 5.70 (H) 02/22/2024    HGBA1C 6.10 (H) 11/20/2023    HGBA1C 6.10 (H) 03/09/2023    CREATININE 1.04 (H) 02/22/2024    LDL 86 02/22/2024    MALBCRERATIO 19 02/22/2024      Was not taking Zepbound for 3-4 weeks due to shortage but did resume recently.   Will check A1c on follow-up in 4 months.

## 2024-05-30 DIAGNOSIS — E11.9 TYPE 2 DIABETES MELLITUS WITHOUT COMPLICATION, WITHOUT LONG-TERM CURRENT USE OF INSULIN: Chronic | ICD-10-CM

## 2024-05-30 DIAGNOSIS — J30.9 ALLERGIC RHINITIS, UNSPECIFIED SEASONALITY, UNSPECIFIED TRIGGER: ICD-10-CM

## 2024-05-30 DIAGNOSIS — I10 PRIMARY HYPERTENSION: Chronic | ICD-10-CM

## 2024-05-30 RX ORDER — AMLODIPINE BESYLATE 10 MG/1
10 TABLET ORAL EVERY EVENING
Qty: 90 TABLET | Refills: 1 | Status: SHIPPED | OUTPATIENT
Start: 2024-05-30 | End: 2025-05-30

## 2024-05-30 RX ORDER — METOPROLOL SUCCINATE 100 MG/1
100 TABLET, EXTENDED RELEASE ORAL EVERY EVENING
Qty: 90 TABLET | Refills: 1 | Status: SHIPPED | OUTPATIENT
Start: 2024-05-30

## 2024-05-30 RX ORDER — HYDROCHLOROTHIAZIDE 25 MG/1
25 TABLET ORAL EVERY MORNING
Qty: 90 TABLET | Refills: 1 | Status: SHIPPED | OUTPATIENT
Start: 2024-05-30

## 2024-05-30 RX ORDER — ALBUTEROL SULFATE 90 UG/1
2 AEROSOL, METERED RESPIRATORY (INHALATION) EVERY 6 HOURS PRN
Qty: 6.7 G | Refills: 2 | Status: SHIPPED | OUTPATIENT
Start: 2024-05-30

## 2024-05-30 RX ORDER — CANDESARTAN 32 MG/1
32 TABLET ORAL EVERY MORNING
Qty: 90 TABLET | Refills: 1 | Status: SHIPPED | OUTPATIENT
Start: 2024-05-30

## 2024-05-30 RX ORDER — FLUTICASONE PROPIONATE 50 MCG
2 SPRAY, SUSPENSION (ML) NASAL DAILY
Qty: 18.2 ML | Refills: 1 | Status: SHIPPED | OUTPATIENT
Start: 2024-05-30

## 2024-06-20 ENCOUNTER — OFFICE VISIT (OUTPATIENT)
Dept: INTERNAL MEDICINE | Age: 54
End: 2024-06-20
Payer: COMMERCIAL

## 2024-06-20 VITALS
BODY MASS INDEX: 45.41 KG/M2 | HEIGHT: 64 IN | HEART RATE: 90 BPM | DIASTOLIC BLOOD PRESSURE: 80 MMHG | SYSTOLIC BLOOD PRESSURE: 135 MMHG | WEIGHT: 266 LBS | OXYGEN SATURATION: 97 % | RESPIRATION RATE: 18 BRPM | TEMPERATURE: 98.4 F

## 2024-06-20 DIAGNOSIS — S16.1XXA ACUTE STRAIN OF NECK MUSCLE, INITIAL ENCOUNTER: Primary | ICD-10-CM

## 2024-06-20 DIAGNOSIS — Q18.1 CYST ON EAR: ICD-10-CM

## 2024-06-20 PROCEDURE — 99214 OFFICE O/P EST MOD 30 MIN: CPT

## 2024-06-20 NOTE — PROGRESS NOTES
"    I N T E R N A L  M E D I C I N E  Rachel Rueda, APRPHILIPPE    ENCOUNTER DATE:  06/20/2024    Tori Urena / 53 y.o. / female      CHIEF COMPLAINT / REASON FOR OFFICE VISIT     Neck Pain (Base of neck and back of neck pressure and it comes around the front part of head it's been going on for 4-5 days. Inside of left ear is starting to swell and has knot inside.)      ASSESSMENT & PLAN     Diagnoses and all orders for this visit:    1. Acute strain of neck muscle, initial encounter (Primary)    2. Cyst on ear  -     Ambulatory Referral to Dermatology         SUMMARY/DISCUSSION  Ultimately suspect her neck pain is musculoskeletal in nature and should improve over the next few days.  Consider acetaminophen, lidocaine pain patches.  Reviewed importance of following up in ER for any acutely worsening neurological changes.    Will refer to dermatology for likely dermoid cyst of left ear.  Reviewed with patient signs/ symptoms of infection for which she would need follow up with our office.        Next Appointment with me: Visit date not found    Return for Next scheduled follow up.      VITAL SIGNS     Visit Vitals  /80   Pulse 90   Temp 98.4 °F (36.9 °C)   Resp 18   Ht 162.6 cm (64.02\")   Wt 121 kg (266 lb)   SpO2 97%   BMI 45.64 kg/m²             Wt Readings from Last 3 Encounters:   06/20/24 121 kg (266 lb)   05/20/24 119 kg (263 lb)   02/22/24 123 kg (272 lb)     Body mass index is 45.64 kg/m².        MEDICATIONS AT THE TIME OF OFFICE VISIT     Current Outpatient Medications on File Prior to Visit   Medication Sig Dispense Refill    albuterol sulfate  (90 Base) MCG/ACT inhaler Inhale 2 puffs Every 6 (Six) Hours As Needed for Wheezing. 6.7 g 2    amLODIPine (NORVASC) 10 MG tablet Take 1 tablet by mouth Every Evening. 90 tablet 1    atorvastatin (LIPITOR) 10 MG tablet TAKE 1 TABLET BY MOUTH EVERY NIGHT 30 tablet 5    candesartan (ATACAND) 32 MG tablet Take 1 tablet by mouth Every Morning. 90 tablet 1    " Cholecalciferol 25 MCG (1000 UT) capsule Take 1 capsule by mouth Daily. 90 capsule 1    dapagliflozin Propanediol (Farxiga) 10 MG tablet Take 10 mg by mouth Every Morning. 90 tablet 1    fluticasone (FLONASE) 50 MCG/ACT nasal spray 2 sprays into the nostril(s) as directed by provider Daily. 18.2 mL 1    Fluticasone Furoate-Vilanterol (Breo Ellipta) 100-25 MCG/ACT aerosol powder  Inhale 1 puff Every Morning.      hydroCHLOROthiazide 25 MG tablet Take 1 tablet by mouth Every Morning. 90 tablet 1    meclizine (ANTIVERT) 12.5 MG tablet Take 1 tablet by mouth 2 (two) times a day. 14 tablet 0    metoprolol succinate XL (TOPROL-XL) 100 MG 24 hr tablet Take 1 tablet by mouth Every Evening. 90 tablet 1    omeprazole (priLOSEC) 20 MG capsule Take 1 capsule by mouth Every Morning Before Breakfast.      SUMAtriptan (IMITREX) 50 MG tablet       Tirzepatide (Mounjaro) 12.5 MG/0.5ML solution pen-injector pen Inject 0.5 mL under the skin into the appropriate area as directed 1 (One) Time Per Week. 6 mL 0     No current facility-administered medications on file prior to visit.        HISTORY OF PRESENT ILLNESS     4-5 day history of mild, left sided cervical neck discomfort which radiates to left occipital scalp area.  No fever, chills, or pain with neck ROM.  Prior history of dermoid cyst on left ear cavum and recently noticed a recurrent nodular swelling on left outer ear; no hearing changes.  No headache, vision changes, rashes, numbness, tingling, weakness, extremity pain. She is shingles vaccinated.        Patient Care Team:  Tate Villaseñor MD as PCP - General (Internal Medicine)  [facility] (Ophthalmology)    REVIEW OF SYSTEMS     Review of Systems   Constitutional:  Negative for chills, fever and unexpected weight change.   Respiratory:  Negative for cough, chest tightness and shortness of breath.    Cardiovascular:  Negative for chest pain, palpitations and leg swelling.   Musculoskeletal:  Positive for neck pain.    Neurological:  Negative for dizziness, weakness, light-headedness and headaches.   Psychiatric/Behavioral:  The patient is not nervous/anxious.           PHYSICAL EXAMINATION     Physical Exam  Vitals reviewed.   Constitutional:       General: She is not in acute distress.     Appearance: Normal appearance. She is not ill-appearing, toxic-appearing or diaphoretic.   HENT:      Head: Normocephalic and atraumatic.      Right Ear: Tympanic membrane, ear canal and external ear normal. There is no impacted cerumen.      Left Ear: Tympanic membrane, ear canal and external ear normal. There is no impacted cerumen.      Ears:        Comments: Small dermoid cyst, no erythema, warmth, drainage, tenderness  Eyes:      Extraocular Movements: Extraocular movements intact.      Pupils: Pupils are equal, round, and reactive to light.   Cardiovascular:      Rate and Rhythm: Normal rate and regular rhythm.      Heart sounds: Normal heart sounds.   Pulmonary:      Effort: Pulmonary effort is normal.      Breath sounds: Normal breath sounds.   Musculoskeletal:      Cervical back: Tenderness (Bilateral trapezius muscles) present. No bony tenderness. No pain with movement.      Right lower leg: No edema.      Left lower leg: No edema.   Neurological:      Mental Status: She is alert and oriented to person, place, and time. Mental status is at baseline.      Sensory: No sensory deficit.      Motor: No weakness.   Psychiatric:         Mood and Affect: Mood normal.         Behavior: Behavior normal.         Thought Content: Thought content normal.         Judgment: Judgment normal.           REVIEWED DATA     Labs:           Imaging:            Medical Tests:           Summary of old records / correspondence / consultant report:           Request outside records:

## 2024-09-03 DIAGNOSIS — E11.9 TYPE 2 DIABETES MELLITUS WITHOUT COMPLICATION, WITHOUT LONG-TERM CURRENT USE OF INSULIN: Chronic | ICD-10-CM

## 2024-09-04 RX ORDER — TIRZEPATIDE 12.5 MG/.5ML
INJECTION, SOLUTION SUBCUTANEOUS
Qty: 2 ML | Refills: 1 | Status: SHIPPED | OUTPATIENT
Start: 2024-09-04

## 2024-09-05 DIAGNOSIS — E11.9 TYPE 2 DIABETES MELLITUS WITHOUT COMPLICATION, WITHOUT LONG-TERM CURRENT USE OF INSULIN: Chronic | ICD-10-CM

## 2024-09-05 RX ORDER — DAPAGLIFLOZIN 10 MG/1
1 TABLET, FILM COATED ORAL EVERY MORNING
Qty: 90 TABLET | Refills: 1 | Status: SHIPPED | OUTPATIENT
Start: 2024-09-05

## 2024-11-22 DIAGNOSIS — E11.9 TYPE 2 DIABETES MELLITUS WITHOUT COMPLICATION, WITHOUT LONG-TERM CURRENT USE OF INSULIN: Chronic | ICD-10-CM

## 2024-11-25 RX ORDER — TIRZEPATIDE 12.5 MG/.5ML
INJECTION, SOLUTION SUBCUTANEOUS
Qty: 2 ML | Refills: 0 | Status: SHIPPED | OUTPATIENT
Start: 2024-11-25

## 2024-12-16 DIAGNOSIS — E11.9 TYPE 2 DIABETES MELLITUS WITHOUT COMPLICATION, WITHOUT LONG-TERM CURRENT USE OF INSULIN: Chronic | ICD-10-CM

## 2024-12-16 DIAGNOSIS — I10 PRIMARY HYPERTENSION: Chronic | ICD-10-CM

## 2024-12-17 RX ORDER — CANDESARTAN 32 MG/1
32 TABLET ORAL EVERY MORNING
Qty: 90 TABLET | Refills: 1 | Status: SHIPPED | OUTPATIENT
Start: 2024-12-17

## 2025-01-28 DIAGNOSIS — E11.9 TYPE 2 DIABETES MELLITUS WITHOUT COMPLICATION, WITHOUT LONG-TERM CURRENT USE OF INSULIN: Chronic | ICD-10-CM

## 2025-01-29 RX ORDER — TIRZEPATIDE 12.5 MG/.5ML
12.5 INJECTION, SOLUTION SUBCUTANEOUS WEEKLY
Qty: 2 ML | Refills: 5 | OUTPATIENT
Start: 2025-01-29

## 2025-02-05 ENCOUNTER — OFFICE VISIT (OUTPATIENT)
Dept: INTERNAL MEDICINE | Age: 55
End: 2025-02-05
Payer: COMMERCIAL

## 2025-02-05 VITALS
HEIGHT: 64 IN | TEMPERATURE: 97.1 F | OXYGEN SATURATION: 95 % | SYSTOLIC BLOOD PRESSURE: 118 MMHG | BODY MASS INDEX: 43.71 KG/M2 | WEIGHT: 256 LBS | HEART RATE: 68 BPM | DIASTOLIC BLOOD PRESSURE: 80 MMHG

## 2025-02-05 DIAGNOSIS — E66.01 MORBID OBESITY WITH BMI OF 45.0-49.9, ADULT: Chronic | ICD-10-CM

## 2025-02-05 DIAGNOSIS — E78.00 PURE HYPERCHOLESTEROLEMIA: Chronic | ICD-10-CM

## 2025-02-05 DIAGNOSIS — E11.9 TYPE 2 DIABETES MELLITUS WITHOUT COMPLICATION, WITHOUT LONG-TERM CURRENT USE OF INSULIN: Primary | Chronic | ICD-10-CM

## 2025-02-05 DIAGNOSIS — J45.40 MODERATE PERSISTENT ASTHMA WITHOUT COMPLICATION: Chronic | ICD-10-CM

## 2025-02-05 DIAGNOSIS — I10 PRIMARY HYPERTENSION: Chronic | ICD-10-CM

## 2025-02-05 PROCEDURE — 99214 OFFICE O/P EST MOD 30 MIN: CPT | Performed by: INTERNAL MEDICINE

## 2025-02-05 NOTE — PROGRESS NOTES
J  U  N  O  H    K  I  M ,   M  D      I  N  T  E  R  N  A  L    M  E  D  I  C  I  N  E         ENCOUNTER DATE:  02/05/2025    Tori Urena / 54 y.o. / female    OFFICE VISIT ENCOUNTER       CHIEF COMPLAINT / REASON FOR OFFICE VISIT     Diabetes      ASSESSMENT & PLAN     Problem List Items Addressed This Visit          High    Type 2 diabetes mellitus without complication, without long-term current use of insulin - Primary (Chronic)    Current Assessment & Plan     Check labs in [place] by next week.   Continue Mounjaro 12.5 mg weekly and dapagliflozin 10 mg qAM.    Lab Results   Component Value Date    HGBA1C 5.70 (H) 02/22/2024    HGBA1C 6.10 (H) 11/20/2023    HGBA1C 6.10 (H) 03/09/2023    CREATININE 1.04 (H) 02/22/2024    LDL 86 02/22/2024    MALBCRERATIO 19 02/22/2024             Relevant Medications    dapagliflozin Propanediol 10 MG tablet    Mounjaro 12.5 MG/0.5ML solution auto-injector    candesartan (ATACAND) 32 MG tablet    Other Relevant Orders    Comprehensive Metabolic Panel    Hemoglobin A1c    Microalbumin / Creatinine Urine Ratio - Urine, Clean Catch       Medium    Hypertension (Chronic)    Overview     Continue:   Candesartan 32 mg AM   HCTZ 25 mg AM  Amlodipine 10 mg PM   Metoprolol succinate 100 mg PM         Relevant Medications    metoprolol succinate XL (TOPROL-XL) 100 MG 24 hr tablet    hydroCHLOROthiazide 25 MG tablet    amLODIPine (NORVASC) 10 MG tablet    candesartan (ATACAND) 32 MG tablet    Other Relevant Orders    Comprehensive Metabolic Panel    Morbid obesity with BMI of 45.0-49.9, adult (Chronic)    Current Assessment & Plan     Wt Readings from Last 3 Encounters:   02/05/25 116 kg (256 lb)   06/20/24 121 kg (266 lb)   05/20/24 119 kg (263 lb)     Body mass index is 43.91 kg/m².     Continued good weight loss.   Continue Mounjaro 12.5 mg weekly.   Maintain a low sugar/starch/carbohydrate diet.   Increase physical activity/exercise as tolerated.          Moderate  "persistent asthma without complication (Chronic)    Overview     Continue Breo 100-25 mcg daily         Relevant Medications    Fluticasone Furoate-Vilanterol (Breo Ellipta) 100-25 MCG/ACT aerosol powder     albuterol sulfate  (90 Base) MCG/ACT inhaler    Hyperlipidemia (Chronic)    Overview     Continue atorvastatin 10 mg daily         Relevant Medications    atorvastatin (LIPITOR) 10 MG tablet    Other Relevant Orders    Comprehensive Metabolic Panel    Lipid Panel With / Chol / HDL Ratio     Orders Placed This Encounter   Procedures    Comprehensive Metabolic Panel     Standing Status:   Future     Standing Expiration Date:   2/5/2026     Order Specific Question:   Release to patient     Answer:   Routine Release [[id number]]    Lipid Panel With / Chol / HDL Ratio     Standing Status:   Future     Standing Expiration Date:   2/5/2026     Order Specific Question:   Release to patient     Answer:   Routine Release [[id number]]    Hemoglobin A1c     Standing Status:   Future     Standing Expiration Date:   2/5/2026     Order Specific Question:   Release to patient     Answer:   Routine Release [[id number]]    Microalbumin / Creatinine Urine Ratio - Urine, Clean Catch     Standing Status:   Future     Standing Expiration Date:   2/5/2026     Order Specific Question:   Release to patient     Answer:   Routine Release [[id number]]     No orders of the defined types were placed in this encounter.      SUMMARY/DISCUSSION        TOTAL TIME OF ENCOUNTER:      Next Appointment with me: 3/26/2025    Return in about 4 months (around 6/5/2025) for Diabetes.      VITAL SIGNS     Vitals:    02/05/25 1552   BP: 118/80   Pulse: 68   Temp: 97.1 °F (36.2 °C)   SpO2: 95%   Weight: 116 kg (256 lb)   Height: 162.6 cm (64.02\")       BP Readings from Last 3 Encounters:   02/05/25 118/80   06/20/24 135/80   05/20/24 120/100     Wt Readings from Last 3 Encounters:   02/05/25 116 kg (256 lb)   06/20/24 121 kg (266 lb)   05/20/24 119 " kg (263 lb)     Body mass index is 43.91 kg/m².    Blood pressure readings recorded on patient flowsheet:       No data to display                MEDICATIONS AT THE TIME OF OFFICE VISIT     Current Outpatient Medications on File Prior to Visit   Medication Sig    albuterol sulfate  (90 Base) MCG/ACT inhaler Inhale 2 puffs Every 6 (Six) Hours As Needed for Wheezing.    amLODIPine (NORVASC) 10 MG tablet Take 1 tablet by mouth Every Evening.    atorvastatin (LIPITOR) 10 MG tablet TAKE 1 TABLET BY MOUTH EVERY NIGHT    candesartan (ATACAND) 32 MG tablet TAKE 1 TABLET BY MOUTH EVERY MORNING    Cholecalciferol 25 MCG (1000 UT) capsule Take 1 capsule by mouth Daily.    dapagliflozin Propanediol 10 MG tablet TAKE 1 TABLET BY MOUTH EVERY MORNING    fluticasone (FLONASE) 50 MCG/ACT nasal spray 2 sprays into the nostril(s) as directed by provider Daily.    Fluticasone Furoate-Vilanterol (Breo Ellipta) 100-25 MCG/ACT aerosol powder  Inhale 1 puff Every Morning.    hydroCHLOROthiazide 25 MG tablet Take 1 tablet by mouth Every Morning.    meclizine (ANTIVERT) 12.5 MG tablet Take 1 tablet by mouth 2 (two) times a day. (Patient taking differently: Take 1 tablet by mouth 2 (two) times a day. prn)    metoprolol succinate XL (TOPROL-XL) 100 MG 24 hr tablet Take 1 tablet by mouth Every Evening.    Mounjaro 12.5 MG/0.5ML solution auto-injector INJECT THE CONTENTS OF 1 PEN UNDER THE SKIN ONCE PER WEEK    omeprazole (priLOSEC) 20 MG capsule Take 1 capsule by mouth Every Morning Before Breakfast.    SUMAtriptan (IMITREX) 50 MG tablet      No current facility-administered medications on file prior to visit.         HISTORY OF PRESENT ILLNESS     Patient denies any significant side effects with Mounjaro 12.5 mg weekly for type 2 diabetes.  She is also on dapagliflozin 10 mg for diabetes.  These 2 drugs have produced significant weight loss of greater than 30 pounds.  Hypertension is better controlled as well currently on amlodipine 10  mg hydrochlorothiazide 25 mg metoprolol succinate 100 mg and candesartan 32 mg daily.  Denies chest pain, dyspnea exertion or excessive lightheadedness.  Asthma remains better controlled with Breo inhaler use daily.  She is on atorvastatin 10 mg for cholesterol.    Lab Results   Component Value Date    HGBA1C 5.70 (H) 02/22/2024    HGBA1C 6.10 (H) 11/20/2023    HGBA1C 6.10 (H) 03/09/2023    CREATININE 1.04 (H) 02/22/2024    LDL 86 02/22/2024    MALBCRERATIO 19 02/22/2024     Blood sugar readings recorded on patient's flowsheet:       No data to display               116 kg (256 lb)    Patient Care Team:  Tate Villaseñor MD as PCP - General (Internal Medicine)  [facility] (Ophthalmology)    REVIEW OF SYSTEMS           PHYSICAL EXAMINATION     Physical Exam  Alert with normal thought and judgment.   Obesity with noted weight loss       REVIEWED DATA     Labs:       Lab Results   Component Value Date     02/22/2024    K 4.0 02/22/2024    CALCIUM 9.8 02/22/2024    AST 17 02/22/2024    ALT 22 02/22/2024    BUN 12 02/22/2024    CREATININE 1.04 (H) 02/22/2024    CREATININE 0.80 03/09/2023    CREATININE 0.83 08/03/2021    EGFRRESULT 64.0 02/22/2024     Lab Results   Component Value Date    HGBA1C 5.70 (H) 02/22/2024    HGBA1C 6.10 (H) 11/20/2023    HGBA1C 6.10 (H) 03/09/2023     Lab Results   Component Value Date    LDL 86 02/22/2024     (H) 11/20/2023     (H) 03/09/2023    HDL 45 02/22/2024    HDL 51 11/20/2023    TRIG 105 02/22/2024    TRIG 91 11/20/2023     Lab Results   Component Value Date    TSH 1.910 03/09/2023    TSH 2.040 04/19/2017    FREET4 1.26 03/09/2023    FREET4 1.18 04/19/2017     Lab Results   Component Value Date    WBC 6.90 03/09/2023    HGB 13.0 03/09/2023     03/09/2023     Lab Results   Component Value Date    MALBCRERATIO 19 02/22/2024         Imaging:           Medical Tests:           Summary of old records / correspondence / consultant report:           Request outside  records:

## 2025-02-05 NOTE — ASSESSMENT & PLAN NOTE
Wt Readings from Last 3 Encounters:   02/05/25 116 kg (256 lb)   06/20/24 121 kg (266 lb)   05/20/24 119 kg (263 lb)     Body mass index is 43.91 kg/m².     Continued good weight loss.   Continue Mounjaro 12.5 mg weekly.   Maintain a low sugar/starch/carbohydrate diet.   Increase physical activity/exercise as tolerated.

## 2025-02-05 NOTE — ASSESSMENT & PLAN NOTE
Check labs in [place] by next week.   Continue Mounjaro 12.5 mg weekly and dapagliflozin 10 mg qAM.    Lab Results   Component Value Date    HGBA1C 5.70 (H) 02/22/2024    HGBA1C 6.10 (H) 11/20/2023    HGBA1C 6.10 (H) 03/09/2023    CREATININE 1.04 (H) 02/22/2024    LDL 86 02/22/2024    MALBCRERATIO 19 02/22/2024

## 2025-02-08 DIAGNOSIS — E11.9 TYPE 2 DIABETES MELLITUS WITHOUT COMPLICATION, WITHOUT LONG-TERM CURRENT USE OF INSULIN: Chronic | ICD-10-CM

## 2025-02-08 DIAGNOSIS — I10 PRIMARY HYPERTENSION: Chronic | ICD-10-CM

## 2025-02-10 RX ORDER — CANDESARTAN 32 MG/1
32 TABLET ORAL EVERY MORNING
Qty: 90 TABLET | Refills: 1 | Status: SHIPPED | OUTPATIENT
Start: 2025-02-10

## 2025-02-10 RX ORDER — METOPROLOL SUCCINATE 100 MG/1
100 TABLET, EXTENDED RELEASE ORAL EVERY EVENING
Qty: 90 TABLET | Refills: 1 | Status: SHIPPED | OUTPATIENT
Start: 2025-02-10

## 2025-02-10 RX ORDER — AMLODIPINE BESYLATE 10 MG/1
10 TABLET ORAL EVERY EVENING
Qty: 90 TABLET | Refills: 1 | Status: SHIPPED | OUTPATIENT
Start: 2025-02-10 | End: 2026-02-10

## 2025-02-10 RX ORDER — TIRZEPATIDE 12.5 MG/.5ML
12.5 INJECTION, SOLUTION SUBCUTANEOUS WEEKLY
Qty: 2 ML | Refills: 2 | Status: SHIPPED | OUTPATIENT
Start: 2025-02-10

## 2025-02-17 ENCOUNTER — TELEPHONE (OUTPATIENT)
Dept: INTERNAL MEDICINE | Age: 55
End: 2025-02-17

## 2025-02-17 NOTE — TELEPHONE ENCOUNTER
Caller: Tori Urena    Relationship to patient: Self      Best call back number: [phone]     Provider: DR MOSQUEDA    Medication PA needed: Tirzepatide (Mounjaro) 12.5 MG/0.5ML solution auto-injector     Reason for call/Prior Auth:     INSURANCE WILL NOT COVER THE MEDICATION WITH OUT THE PRIOR AUTHORIZATION    PATIENT IS REQUESTING A 90 DAY SUPPLY ON THE MEDICATION DUE TO THE FACT SHE HAS TO TRAVEL SO FAR TO GET THE MEDICATION      [facility] - [address] [phone] [facility] [phone]

## 2025-02-18 ENCOUNTER — PRIOR AUTHORIZATION (OUTPATIENT)
Dept: INTERNAL MEDICINE | Age: 55
End: 2025-02-18
Payer: COMMERCIAL

## 2025-02-18 NOTE — TELEPHONE ENCOUNTER
Patient called. They want to know what the status is on this medication. They have been without it for two months.     They said they've been informed to get labs, but she hasn't had the medication for two months so she doesn't think she should get them.    Please call back via status of medication and about the labs.

## 2025-02-18 NOTE — TELEPHONE ENCOUNTER
pproved today by [facility] 2017  Your PA request has been approved. Additional information will be provided in the approval communication. (Message 1149)  Authorization Expiration Date: 2/17/2026

## 2025-03-04 DIAGNOSIS — E11.9 TYPE 2 DIABETES MELLITUS WITHOUT COMPLICATION, WITHOUT LONG-TERM CURRENT USE OF INSULIN: Chronic | ICD-10-CM

## 2025-03-04 RX ORDER — DAPAGLIFLOZIN 10 MG/1
1 TABLET, FILM COATED ORAL EVERY MORNING
Qty: 90 TABLET | Refills: 1 | Status: SHIPPED | OUTPATIENT
Start: 2025-03-04

## 2025-05-01 DIAGNOSIS — I10 PRIMARY HYPERTENSION: Chronic | ICD-10-CM

## 2025-05-01 NOTE — TELEPHONE ENCOUNTER
Caller: [id number] - [place], [place] - [address] AT [facility] [phone]  - [phone] FX    Relationship: Pharmacy    Best call back number: [phone]    Requested Prescriptions:   Requested Prescriptions     Pending Prescriptions Disp Refills    hydroCHLOROthiazide 25 MG tablet 90 tablet 1     Sig: Take 1 tablet by mouth Every Morning.        Pharmacy where request should be sent: [id number] - [place], [place] - [address] AT [facility] [phone]  - [phone] FX     Last office visit with prescribing clinician: 2/5/2025   Last telemedicine visit with prescribing clinician: Visit date not found   Next office visit with prescribing clinician: 6/6/2025     Additional details provided by patient:     Does the patient have less than a 3 day supply:  [x] Yes  [] No    Would you like a call back once the refill request has been completed: [] Yes [x] No    If the office needs to give you a call back, can they leave a voicemail: [] Yes [x] No    Debbie Rios Rep   05/01/25 13:05 EDT

## 2025-05-02 RX ORDER — HYDROCHLOROTHIAZIDE 25 MG/1
25 TABLET ORAL EVERY MORNING
Qty: 90 TABLET | Refills: 1 | Status: SHIPPED | OUTPATIENT
Start: 2025-05-02

## 2025-06-02 DIAGNOSIS — E11.9 TYPE 2 DIABETES MELLITUS WITHOUT COMPLICATION, WITHOUT LONG-TERM CURRENT USE OF INSULIN: Chronic | ICD-10-CM

## 2025-06-02 RX ORDER — TIRZEPATIDE 12.5 MG/.5ML
12.5 INJECTION, SOLUTION SUBCUTANEOUS WEEKLY
Qty: 2 ML | Refills: 2 | Status: CANCELLED | OUTPATIENT
Start: 2025-06-02

## 2025-06-03 RX ORDER — TIRZEPATIDE 12.5 MG/.5ML
INJECTION, SOLUTION SUBCUTANEOUS
Qty: 2 ML | Refills: 0 | Status: SHIPPED | OUTPATIENT
Start: 2025-06-03 | End: 2025-06-06 | Stop reason: DRUGHIGH

## 2025-06-04 ENCOUNTER — LAB (OUTPATIENT)
Dept: LAB | Facility: HOSPITAL | Age: 55
End: 2025-06-04
Payer: COMMERCIAL

## 2025-06-04 DIAGNOSIS — E11.9 TYPE 2 DIABETES MELLITUS WITHOUT COMPLICATION, WITHOUT LONG-TERM CURRENT USE OF INSULIN: Primary | ICD-10-CM

## 2025-06-04 DIAGNOSIS — I10 PRIMARY HYPERTENSION: ICD-10-CM

## 2025-06-04 DIAGNOSIS — E78.00 PURE HYPERCHOLESTEROLEMIA: ICD-10-CM

## 2025-06-04 LAB
ALBUMIN SERPL-MCNC: 3.9 G/DL (ref 3.5–5.2)
ALBUMIN UR-MCNC: 2 MG/DL
ALBUMIN/GLOB SERPL: 1.1 G/DL
ALP SERPL-CCNC: 95 U/L (ref 39–117)
ALT SERPL W P-5'-P-CCNC: 20 U/L (ref 1–33)
ANION GAP SERPL CALCULATED.3IONS-SCNC: 9 MMOL/L (ref 5–15)
AST SERPL-CCNC: 24 U/L (ref 1–32)
BILIRUB SERPL-MCNC: 0.4 MG/DL (ref 0–1.2)
BUN SERPL-MCNC: 11 MG/DL (ref 6–20)
BUN/CREAT SERPL: 12.2 (ref 7–25)
CALCIUM SPEC-SCNC: 9.2 MG/DL (ref 8.6–10.5)
CHLORIDE SERPL-SCNC: 101 MMOL/L (ref 98–107)
CHOLEST SERPL-MCNC: 168 MG/DL (ref 0–200)
CO2 SERPL-SCNC: 30 MMOL/L (ref 22–29)
CREAT SERPL-MCNC: 0.9 MG/DL (ref 0.57–1)
CREAT UR-MCNC: 314.4 MG/DL
EGFRCR SERPLBLD CKD-EPI 2021: 75.7 ML/MIN/1.73
GLOBULIN UR ELPH-MCNC: 3.5 GM/DL
GLUCOSE SERPL-MCNC: 82 MG/DL (ref 65–99)
HBA1C MFR BLD: 5.7 % (ref 4.8–5.6)
HDLC SERPL QL: 3.73
HDLC SERPL-MCNC: 45 MG/DL (ref 40–60)
LDLC SERPL CALC-MCNC: 105 MG/DL (ref 0–100)
MICROALBUMIN/CREAT UR: 6.4 MG/G (ref 0–29)
POTASSIUM SERPL-SCNC: 3.7 MMOL/L (ref 3.5–5.2)
PROT SERPL-MCNC: 7.4 G/DL (ref 6–8.5)
SODIUM SERPL-SCNC: 140 MMOL/L (ref 136–145)
TRIGL SERPL-MCNC: 97 MG/DL (ref 0–150)
VLDLC SERPL-MCNC: 18 MG/DL (ref 5–40)

## 2025-06-04 PROCEDURE — 83036 HEMOGLOBIN GLYCOSYLATED A1C: CPT

## 2025-06-04 PROCEDURE — 80061 LIPID PANEL: CPT

## 2025-06-04 PROCEDURE — 82570 ASSAY OF URINE CREATININE: CPT

## 2025-06-04 PROCEDURE — 80053 COMPREHEN METABOLIC PANEL: CPT

## 2025-06-04 PROCEDURE — 36415 COLL VENOUS BLD VENIPUNCTURE: CPT

## 2025-06-04 PROCEDURE — 82043 UR ALBUMIN QUANTITATIVE: CPT

## 2025-06-06 ENCOUNTER — TELEMEDICINE (OUTPATIENT)
Dept: INTERNAL MEDICINE | Age: 55
End: 2025-06-06
Payer: COMMERCIAL

## 2025-06-06 DIAGNOSIS — R41.3 POOR SHORT-TERM MEMORY: ICD-10-CM

## 2025-06-06 DIAGNOSIS — G47.33 OSA (OBSTRUCTIVE SLEEP APNEA): Chronic | ICD-10-CM

## 2025-06-06 DIAGNOSIS — E66.01 MORBID OBESITY WITH BMI OF 45.0-49.9, ADULT: Chronic | ICD-10-CM

## 2025-06-06 DIAGNOSIS — E11.9 TYPE 2 DIABETES MELLITUS WITHOUT COMPLICATION, WITHOUT LONG-TERM CURRENT USE OF INSULIN: Primary | Chronic | ICD-10-CM

## 2025-06-06 DIAGNOSIS — E78.00 PURE HYPERCHOLESTEROLEMIA: Chronic | ICD-10-CM

## 2025-06-06 NOTE — ASSESSMENT & PLAN NOTE
Will increase Mounjaro to 15 mg weekly (to optimize weight loss / diabetes).     Current weight 254 lbs at home, no significant GI side effects.

## 2025-06-06 NOTE — ASSESSMENT & PLAN NOTE
Lab Results   Component Value Date     (H) 06/04/2025    LDL 86 02/22/2024     (H) 11/20/2023     Lab Results   Component Value Date    HDL 45 06/04/2025    HDL 45 02/22/2024    HDL 51 11/20/2023     Lab Results   Component Value Date    TRIG 97 06/04/2025      LDL cholesterol is suboptimal/higher. Continue atorvastatin 10 mg for now. Will consider increasing dose on follow-up (having issues with increasing forgetfulness).

## 2025-06-06 NOTE — ASSESSMENT & PLAN NOTE
Refer to section on JAS. Not using CPAP. Use CPAP nightly. If her symptoms do not improve significantly by 3 months, recommended neuropsychology evaluation. She expressed understanding and agreed to follow above instructions.

## 2025-06-06 NOTE — ASSESSMENT & PLAN NOTE
Telehealth     Lab Results   Component Value Date    HGBA1C 5.70 (H) 06/04/2025    HGBA1C 5.70 (H) 02/22/2024    HGBA1C 6.10 (H) 11/20/2023    CREATININE 0.90 06/04/2025     (H) 06/04/2025    MALBCRERATIO 6.4 06/04/2025      A1c is stable. Continue Mounjaro 12.5 mg weekly. Will notify me of current weight. Consider increasing Mounjaro to 15 mg.

## 2025-06-06 NOTE — PROGRESS NOTES
J  U  N  O  H    K  I  M ,   M  D      I  N  T  E  R  N  A  L    M  E  D  I  C  I  N  E         ENCOUNTER DATE:  06/06/2025    Tori Urena / 54 y.o. / female    TELEHEALTH VISIT ENCOUNTER     You have chosen to receive care through a telehealth visit  Mode of Visit: Video with audio (visit included audio and video interaction)  No technical issues occurred during this visit  The patient has signed the video visit consent form.  Location of patient: -HOME-  Location of provider: +[facility] [place] [facility]+    CHIEF COMPLAINT / REASON FOR OFFICE VISIT     Diabetes, Obesity, and Increasing forgetfulness      ASSESSMENT & PLAN     Problem List Items Addressed This Visit          High    Type 2 diabetes mellitus without complication, without long-term current use of insulin - Primary (Chronic)    Current Assessment & Plan   Telehealth     Lab Results   Component Value Date    HGBA1C 5.70 (H) 06/04/2025    HGBA1C 5.70 (H) 02/22/2024    HGBA1C 6.10 (H) 11/20/2023    CREATININE 0.90 06/04/2025     (H) 06/04/2025    MALBCRERATIO 6.4 06/04/2025      A1c is stable. Continue Mounjaro 12.5 mg weekly. Will notify me of current weight. Consider increasing Mounjaro to 15 mg.          Relevant Medications    candesartan (ATACAND) 32 MG tablet    dapagliflozin Propanediol 10 MG tablet    Tirzepatide (Mounjaro) 12.5 MG/0.5ML solution auto-injector    Poor short-term memory (Chronic)    Current Assessment & Plan   Refer to section on JAS. Not using CPAP. Use CPAP nightly. If her symptoms do not improve significantly by 3 months, recommended neuropsychology evaluation. She expressed understanding and agreed to follow above instructions.             Medium    JAS (obstructive sleep apnea) (Chronic)    Current Assessment & Plan   Received her replacement CPAP 2 weeks ago but has not started using.   Advised to use CPAP night especially in light of increasing cognitive difficulties (forgetfulness/short term memory).           Hyperlipidemia (Chronic)    Overview   Continue atorvastatin 10 mg daily         Current Assessment & Plan      Lab Results   Component Value Date     (H) 06/04/2025    LDL 86 02/22/2024     (H) 11/20/2023     Lab Results   Component Value Date    HDL 45 06/04/2025    HDL 45 02/22/2024    HDL 51 11/20/2023     Lab Results   Component Value Date    TRIG 97 06/04/2025      LDL cholesterol is suboptimal/higher. Continue atorvastatin 10 mg for now. Will consider increasing dose on follow-up (having issues with increasing forgetfulness).         Relevant Medications    atorvastatin (LIPITOR) 10 MG tablet       Low    Morbid obesity with BMI of 45.0-49.9, adult (Chronic)    Current Assessment & Plan   Will notify me of her current weight. Will likely increase Mounjaro to 15 mg.           No orders of the defined types were placed in this encounter.    No orders of the defined types were placed in this encounter.      SUMMARY/DISCUSSION        TOTAL TIME OF ENCOUNTER:  20 MINUTES      Next Appointment with me: Visit date not found    Return in about 3 months (around 9/6/2025) for Diabetes, Obesity, Cognitive change.      VITAL SIGNS     There were no vitals filed for this visit.    BP Readings from Last 3 Encounters:   02/05/25 118/80   06/20/24 135/80   05/20/24 120/100     Wt Readings from Last 3 Encounters:   02/05/25 116 kg (256 lb)   06/20/24 121 kg (266 lb)   05/20/24 119 kg (263 lb)     There is no height or weight on file to calculate BMI.    Blood pressure readings recorded on patient flowsheet:       No data to display                  MEDICATIONS AT THE TIME OF OFFICE VISIT     Current Outpatient Medications on File Prior to Visit   Medication Sig    albuterol sulfate  (90 Base) MCG/ACT inhaler Inhale 2 puffs Every 6 (Six) Hours As Needed for Wheezing.    amLODIPine (NORVASC) 10 MG tablet Take 1 tablet by mouth Every Evening.    atorvastatin (LIPITOR) 10 MG tablet TAKE 1 TABLET BY MOUTH  EVERY NIGHT    candesartan (ATACAND) 32 MG tablet Take 1 tablet by mouth Every Morning.    Cholecalciferol 25 MCG (1000 UT) capsule Take 1 capsule by mouth Daily.    dapagliflozin Propanediol 10 MG tablet TAKE 1 TABLET BY MOUTH EVERY MORNING    fluticasone (FLONASE) 50 MCG/ACT nasal spray 2 sprays into the nostril(s) as directed by provider Daily.    Fluticasone Furoate-Vilanterol (Breo Ellipta) 100-25 MCG/ACT aerosol powder  Inhale 1 puff Every Morning.    hydroCHLOROthiazide 25 MG tablet Take 1 tablet by mouth Every Morning.    meclizine (ANTIVERT) 12.5 MG tablet Take 1 tablet by mouth 2 (two) times a day. (Patient taking differently: Take 1 tablet by mouth 2 (two) times a day. prn)    metoprolol succinate XL (TOPROL-XL) 100 MG 24 hr tablet Take 1 tablet by mouth Every Evening.    omeprazole (priLOSEC) 20 MG capsule Take 1 capsule by mouth Every Morning Before Breakfast.    SUMAtriptan (IMITREX) 50 MG tablet     Tirzepatide (Mounjaro) 12.5 MG/0.5ML solution auto-injector INJECT THE CONTENTS OF 1 PEN UNDER THE SKIN ONCE PER WEEK     No current facility-administered medications on file prior to visit.          HISTORY OF PRESENT ILLNESS     Complains of increasing forgetfulness and problem with short term memory. Not using CPAP although she received her replacement unit 2 weeks ago.     Not sure of her weight. Last time she weighed herself was over 6 weeks ago. She is taking Mounjaro 12.5 mg weekly without significant GI side effects. A1c stable 5.7.     Blood pressure today 113/89 HR 77 on multidrug regimen.     LDL cholesterol higher, on atorvastatin 10 mg.   Lab Results   Component Value Date     (H) 06/04/2025    LDL 86 02/22/2024     (H) 11/20/2023    TRIG 97 06/04/2025    CHOLHDLRATIO 3.73 06/04/2025        Patient Care Team:  Tate Villaseñor MD as PCP - General (Internal Medicine)  eye [facility] (Ophthalmology)    REVIEW OF SYSTEMS     Review of Systems       PHYSICAL EXAMINATION     Physical  Exam  Alert   Normal affect and mood.   Obesity       REVIEWED DATA     Labs:     Lab Results   Component Value Date     06/04/2025    K 3.7 06/04/2025    CALCIUM 9.2 06/04/2025    AST 24 06/04/2025    ALT 20 06/04/2025    BUN 11.0 06/04/2025    CREATININE 0.90 06/04/2025    CREATININE 1.04 (H) 02/22/2024    CREATININE 0.80 03/09/2023    EGFR 75.7 06/04/2025     Lab Results   Component Value Date    HGBA1C 5.70 (H) 06/04/2025    HGBA1C 5.70 (H) 02/22/2024    HGBA1C 6.10 (H) 11/20/2023     Lab Results   Component Value Date    MALBCRERATIO 6.4 06/04/2025     Lab Results   Component Value Date     (H) 06/04/2025    LDL 86 02/22/2024     (H) 11/20/2023    HDL 45 06/04/2025    HDL 45 02/22/2024    TRIG 97 06/04/2025    CHOLHDLRATIO 3.73 06/04/2025     Lab Results   Component Value Date    TSH 1.910 03/09/2023    TSH 2.040 04/19/2017    FREET4 1.26 03/09/2023    FREET4 1.18 04/19/2017     Lab Results   Component Value Date    WBC 6.90 03/09/2023    HGB 13.0 03/09/2023     03/09/2023           Imaging:               Medical Tests:               Summary of old records / correspondence / consultant report:             Request outside records:

## 2025-06-06 NOTE — ASSESSMENT & PLAN NOTE
Received her replacement CPAP 2 weeks ago but has not started using.   Advised to use CPAP night especially in light of increasing cognitive difficulties (forgetfulness/short term memory).

## 2025-08-25 DIAGNOSIS — E11.9 TYPE 2 DIABETES MELLITUS WITHOUT COMPLICATION, WITHOUT LONG-TERM CURRENT USE OF INSULIN: Chronic | ICD-10-CM

## 2025-08-26 RX ORDER — TIRZEPATIDE 15 MG/.5ML
INJECTION, SOLUTION SUBCUTANEOUS
Qty: 2 ML | Refills: 2 | Status: SHIPPED | OUTPATIENT
Start: 2025-08-26